# Patient Record
Sex: FEMALE | Race: WHITE | NOT HISPANIC OR LATINO | Employment: FULL TIME | URBAN - METROPOLITAN AREA
[De-identification: names, ages, dates, MRNs, and addresses within clinical notes are randomized per-mention and may not be internally consistent; named-entity substitution may affect disease eponyms.]

---

## 2018-03-08 ENCOUNTER — OFFICE VISIT (OUTPATIENT)
Dept: OBSTETRICS AND GYNECOLOGY | Facility: CLINIC | Age: 28
End: 2018-03-08
Attending: OBSTETRICS & GYNECOLOGY
Payer: COMMERCIAL

## 2018-03-08 VITALS
WEIGHT: 264 LBS | HEIGHT: 69 IN | SYSTOLIC BLOOD PRESSURE: 126 MMHG | BODY MASS INDEX: 39.1 KG/M2 | DIASTOLIC BLOOD PRESSURE: 78 MMHG

## 2018-03-08 DIAGNOSIS — Z12.4 PAP SMEAR FOR CERVICAL CANCER SCREENING: Primary | ICD-10-CM

## 2018-03-08 DIAGNOSIS — Z01.419 ENCOUNTER FOR GYNECOLOGICAL EXAMINATION (GENERAL) (ROUTINE) WITHOUT ABNORMAL FINDINGS: ICD-10-CM

## 2018-03-08 PROCEDURE — 99999 PR PBB SHADOW E&M-EST. PATIENT-LVL III: CPT | Mod: PBBFAC,,, | Performed by: OBSTETRICS & GYNECOLOGY

## 2018-03-08 PROCEDURE — 88175 CYTOPATH C/V AUTO FLUID REDO: CPT

## 2018-03-08 PROCEDURE — 99395 PREV VISIT EST AGE 18-39: CPT | Mod: S$GLB,,, | Performed by: OBSTETRICS & GYNECOLOGY

## 2018-03-08 RX ORDER — ETONOGESTREL AND ETHINYL ESTRADIOL VAGINAL RING .015; .12 MG/D; MG/D
1 RING VAGINAL
Qty: 3 EACH | Refills: 10 | Status: CANCELLED | OUTPATIENT
Start: 2018-03-08

## 2018-03-08 RX ORDER — CLINDAMYCIN HYDROCHLORIDE 300 MG/1
CAPSULE ORAL
COMMUNITY
Start: 2018-01-31 | End: 2019-03-11

## 2018-03-08 RX ORDER — ETONOGESTREL AND ETHINYL ESTRADIOL VAGINAL RING .015; .12 MG/D; MG/D
1 RING VAGINAL
Qty: 3 EACH | Refills: 11 | Status: SHIPPED | OUTPATIENT
Start: 2018-03-08 | End: 2019-03-11 | Stop reason: SDUPTHER

## 2018-03-08 RX ORDER — MUPIROCIN 20 MG/G
OINTMENT TOPICAL
COMMUNITY
Start: 2018-01-31 | End: 2019-03-11

## 2018-03-08 NOTE — PROGRESS NOTES
Subjective:       Patient ID: Evelin Tripp is a 27 y.o. female.    Chief Complaint:  Annual Exam      Patient Active Problem List   Diagnosis    Family history pertaining to mother       History of Present Illness  27 y.o. yo  here for annual exam. No gyn complaints. Doing well. Was off Nuvaring for a few months because she ran out. Increased stress at work.  at [employer]. Only 2 of them, works long hours. Rarely gets a day off. Crying. Says she is not depressed just stressed out with work. Otherwise doing well. Daughter is 2 years old.       History reviewed. No pertinent past medical history.    Past Surgical History:   Procedure Laterality Date    imbilical hernia       UMBILICAL HERNIA REPAIR         OB History    Para Term  AB Living   1 1 1 0 0 1   SAB TAB Ectopic Multiple Live Births   0 0 0 0 1      # Outcome Date GA Lbr Ralph/2nd Weight Sex Delivery Anes PTL Lv   1 Term 16 40w0d  3.544 kg (7 lb 13 oz) F Vag-Spont  N JACKIE          Patient's last menstrual period was 2018.   Date of Last Pap: 2016    Review of Systems  Review of Systems   Constitutional: Negative for fatigue and unexpected weight change.   Respiratory: Negative for shortness of breath.    Cardiovascular: Negative for chest pain.   Gastrointestinal: Negative for abdominal pain, constipation, diarrhea, nausea and vomiting.   Genitourinary: Negative for dysuria.   Musculoskeletal: Negative for back pain.   Skin: Negative for rash.   Neurological: Negative for headaches.   Hematological: Does not bruise/bleed easily.   Psychiatric/Behavioral: Negative for behavioral problems.        Objective:   Physical Exam:   Constitutional: She is oriented to person, place, and time. Vital signs are normal. She appears well-developed and well-nourished. No distress.        Pulmonary/Chest: She exhibits no mass. Right breast exhibits no mass, no nipple discharge, no skin change, no tenderness, no  bleeding and no swelling. Left breast exhibits no mass, no nipple discharge, no skin change, no tenderness, no bleeding and no swelling. Breasts are symmetrical.        Abdominal: Soft. Normal appearance and bowel sounds are normal. She exhibits no distension and no mass. There is no tenderness. There is no rebound.     Genitourinary: Vagina normal and uterus normal. There is no rash, tenderness, lesion or injury on the right labia. There is no rash, tenderness, lesion or injury on the left labia. Uterus is not deviated, not enlarged, not fixed, not tender, not hosting fibroids and not experiencing uterine prolapse. Cervix is normal. Right adnexum displays no mass, no tenderness and no fullness. Left adnexum displays no mass, no tenderness and no fullness. No erythema, tenderness, rectocele, cystocele or unspecified prolapse of vaginal walls in the vagina. No vaginal discharge found. Cervix exhibits no motion tenderness, no discharge and no friability.           Musculoskeletal: Normal range of motion and moves all extremeties.      Lymphadenopathy:     She has no axillary adenopathy.        Right: No supraclavicular adenopathy present.        Left: No supraclavicular adenopathy present.    Neurological: She is alert and oriented to person, place, and time.    Skin: Skin is warm and dry.    Psychiatric: She has a normal mood and affect. Her behavior is normal. Judgment normal.        Assessment/ Plan:     1. Pap smear for cervical cancer screening  Liquid-based pap smear, screening   2. Encounter for gynecological examination (general) (routine) without abnormal findings  etonogestrel-ethinyl estradiol (NUVARING) 0.12-0.015 mg/24 hr vaginal ring       Follow-up with me in 1 year

## 2019-03-11 ENCOUNTER — OFFICE VISIT (OUTPATIENT)
Dept: OBSTETRICS AND GYNECOLOGY | Facility: CLINIC | Age: 29
End: 2019-03-11
Payer: MEDICAID

## 2019-03-11 VITALS
SYSTOLIC BLOOD PRESSURE: 124 MMHG | HEIGHT: 69 IN | WEIGHT: 268.44 LBS | BODY MASS INDEX: 39.76 KG/M2 | DIASTOLIC BLOOD PRESSURE: 80 MMHG

## 2019-03-11 DIAGNOSIS — Z01.419 ENCOUNTER FOR GYNECOLOGICAL EXAMINATION (GENERAL) (ROUTINE) WITHOUT ABNORMAL FINDINGS: ICD-10-CM

## 2019-03-11 DIAGNOSIS — Z12.4 ENCOUNTER FOR PAPANICOLAOU SMEAR FOR CERVICAL CANCER SCREENING: Primary | ICD-10-CM

## 2019-03-11 PROCEDURE — 99395 PREV VISIT EST AGE 18-39: CPT | Mod: S$PBB,,, | Performed by: OBSTETRICS & GYNECOLOGY

## 2019-03-11 PROCEDURE — 88175 CYTOPATH C/V AUTO FLUID REDO: CPT

## 2019-03-11 PROCEDURE — 99999 PR PBB SHADOW E&M-EST. PATIENT-LVL III: ICD-10-PCS | Mod: PBBFAC,,, | Performed by: OBSTETRICS & GYNECOLOGY

## 2019-03-11 PROCEDURE — 99213 OFFICE O/P EST LOW 20 MIN: CPT | Mod: PBBFAC,PN | Performed by: OBSTETRICS & GYNECOLOGY

## 2019-03-11 PROCEDURE — 99999 PR PBB SHADOW E&M-EST. PATIENT-LVL III: CPT | Mod: PBBFAC,,, | Performed by: OBSTETRICS & GYNECOLOGY

## 2019-03-11 PROCEDURE — 99395 PR PREVENTIVE VISIT,EST,18-39: ICD-10-PCS | Mod: S$PBB,,, | Performed by: OBSTETRICS & GYNECOLOGY

## 2019-03-11 RX ORDER — ETONOGESTREL AND ETHINYL ESTRADIOL VAGINAL RING .015; .12 MG/D; MG/D
1 RING VAGINAL
Qty: 3 EACH | Refills: 11 | Status: SHIPPED | OUTPATIENT
Start: 2019-03-11 | End: 2022-06-27

## 2019-03-11 RX ORDER — LEVOFLOXACIN 500 MG/1
TABLET, FILM COATED ORAL
Refills: 0 | COMMUNITY
Start: 2019-01-11 | End: 2019-03-11

## 2019-03-11 NOTE — PROGRESS NOTES
Subjective:       Patient ID: Evelin Tripp is a 28 y.o. female.    Chief Complaint:  Annual Exam (last pap 2018 normal )      Patient Active Problem List   Diagnosis    Family history pertaining to mother       History of Present Illness  28 y.o. yo  here for annual exam. No gyn complaints. Doing well. Happy wit Nuvaring. Recently fired from job, was very stressful. Daughter doing well.       History reviewed. No pertinent past medical history.    Past Surgical History:   Procedure Laterality Date    imbilical hernia       UMBILICAL HERNIA REPAIR         OB History    Para Term  AB Living   1 1 1 0 0 1   SAB TAB Ectopic Multiple Live Births   0 0 0 0 1      # Outcome Date GA Lbr Ralph/2nd Weight Sex Delivery Anes PTL Lv   1 Term 16 40w0d  3.544 kg (7 lb 13 oz) F Vag-Spont  N JACKIE          Patient's last menstrual period was 2019.   Date of Last Pap: 3/13/2018    Review of Systems  Review of Systems   Constitutional: Negative for fatigue and unexpected weight change.   Respiratory: Negative for shortness of breath.    Cardiovascular: Negative for chest pain.   Gastrointestinal: Negative for abdominal pain, constipation, diarrhea, nausea and vomiting.   Genitourinary: Negative for dysuria.   Musculoskeletal: Negative for back pain.   Skin: Negative for rash.   Neurological: Negative for headaches.   Hematological: Does not bruise/bleed easily.   Psychiatric/Behavioral: Negative for behavioral problems.        Objective:   Physical Exam:   Constitutional: She is oriented to person, place, and time. Vital signs are normal. She appears well-developed and well-nourished. No distress.        Pulmonary/Chest: She exhibits no mass. Right breast exhibits no mass, no nipple discharge, no skin change, no tenderness, no bleeding and no swelling. Left breast exhibits no mass, no nipple discharge, no skin change, no tenderness, no bleeding and no swelling. Breasts are symmetrical.         Abdominal: Soft. Normal appearance and bowel sounds are normal. She exhibits no distension and no mass. There is no tenderness. There is no rebound.     Genitourinary: Vagina normal and uterus normal. There is no rash, tenderness, lesion or injury on the right labia. There is no rash, tenderness, lesion or injury on the left labia. Uterus is not deviated, not enlarged, not fixed, not tender, not hosting fibroids and not experiencing uterine prolapse. Cervix is normal. Right adnexum displays no mass, no tenderness and no fullness. Left adnexum displays no mass, no tenderness and no fullness. No erythema, tenderness, rectocele, cystocele or unspecified prolapse of vaginal walls in the vagina. No vaginal discharge found. Cervix exhibits no motion tenderness, no discharge and no friability.           Musculoskeletal: Normal range of motion and moves all extremeties.      Lymphadenopathy:     She has no axillary adenopathy.        Right: No supraclavicular adenopathy present.        Left: No supraclavicular adenopathy present.    Neurological: She is alert and oriented to person, place, and time.    Skin: Skin is warm and dry.    Psychiatric: She has a normal mood and affect. Her behavior is normal. Judgment normal.        Assessment/ Plan:     1. Encounter for Papanicolaou smear for cervical cancer screening  Liquid-based pap smear, screening   2. Encounter for gynecological examination (general) (routine) without abnormal findings  etonogestrel-ethinyl estradiol (NUVARING) 0.12-0.015 mg/24 hr vaginal ring       Follow-up with me in 1 year

## 2020-03-16 ENCOUNTER — TELEPHONE (OUTPATIENT)
Dept: OBSTETRICS AND GYNECOLOGY | Facility: CLINIC | Age: 30
End: 2020-03-16

## 2020-05-28 ENCOUNTER — OFFICE VISIT (OUTPATIENT)
Dept: URGENT CARE | Facility: CLINIC | Age: 30
End: 2020-05-28
Payer: MEDICAID

## 2020-05-28 VITALS
TEMPERATURE: 99 F | RESPIRATION RATE: 16 BRPM | OXYGEN SATURATION: 97 % | HEART RATE: 86 BPM | HEIGHT: 69 IN | WEIGHT: 285 LBS | BODY MASS INDEX: 42.21 KG/M2

## 2020-05-28 DIAGNOSIS — K12.0 APHTHOUS ULCER: ICD-10-CM

## 2020-05-28 DIAGNOSIS — J01.90 ACUTE BACTERIAL SINUSITIS: Primary | ICD-10-CM

## 2020-05-28 DIAGNOSIS — B96.89 ACUTE BACTERIAL SINUSITIS: Primary | ICD-10-CM

## 2020-05-28 PROCEDURE — 99213 PR OFFICE/OUTPT VISIT, EST, LEVL III, 20-29 MIN: ICD-10-PCS | Mod: S$GLB,,, | Performed by: PHYSICIAN ASSISTANT

## 2020-05-28 PROCEDURE — 99213 OFFICE O/P EST LOW 20 MIN: CPT | Mod: S$GLB,,, | Performed by: PHYSICIAN ASSISTANT

## 2020-05-28 RX ORDER — AZELASTINE 1 MG/ML
1 SPRAY, METERED NASAL 2 TIMES DAILY
Qty: 30 ML | Refills: 0 | Status: SHIPPED | OUTPATIENT
Start: 2020-05-28 | End: 2022-06-27

## 2020-05-28 RX ORDER — DROSPIRENONE AND ETHINYL ESTRADIOL TABLETS 0.02-3(28)
KIT ORAL
COMMUNITY
Start: 2020-05-27 | End: 2022-06-27 | Stop reason: SDUPTHER

## 2020-05-28 RX ORDER — AMOXICILLIN AND CLAVULANATE POTASSIUM 875; 125 MG/1; MG/1
1 TABLET, FILM COATED ORAL 2 TIMES DAILY
Qty: 20 TABLET | Refills: 0 | Status: SHIPPED | OUTPATIENT
Start: 2020-05-28 | End: 2020-06-07

## 2020-05-28 RX ORDER — FLUOXETINE HYDROCHLORIDE 20 MG/1
CAPSULE ORAL
COMMUNITY
Start: 2020-04-10 | End: 2022-06-27

## 2020-05-29 NOTE — PATIENT INSTRUCTIONS
PLEASE READ YOUR DISCHARGE INSTRUCTIONS ENTIRELY AS IT CONTAINS IMPORTANT INFORMATION.  - Rest.    - Drink plenty of fluids.    - Tylenol or Ibuprofen as directed as needed for fever/pain.    - If you were prescribed antibiotics, please take them to completion.  - If you are female and on birth control pills - please use additional methods of contraception to prevent pregnancy while on antibiotics and for one cycle after.   - If you were prescribed a narcotic medication or muscle relaxer, do not drive or operate heavy equipment or machinery while taking these medications, as they can cause drowsiness.   - If you smoke, please stop smoking.  -You must understand that you've received an Urgent Care treatment only and that you may be released before all your medical problems are known or treated. You, the patient, will    arrange for follow up care as instructed. Please arrange follow up with your primary medical clinic as soon as possible.   - Follow up with your PCP or specialty clinic as directed in the next 1-2 weeks if not improved or as needed.  You can call [phone] to schedule an appointment with the appropriate provider.    - Please return to Urgent Care or to the Emergency Department if your symptoms worsen.    Patient aware and verbalized understanding.        Acute Bacterial Rhinosinusitis (ABRS)    Acute bacterial rhinosinusitis (ABRS) is an infection of your nasal cavity and sinuses. Its caused by bacteria. Acute means that youve had symptoms for less than 12 weeks.  Understanding your sinuses  The nasal cavity is the large air-filled space behind your nose. The sinuses are a group of spaces formed by the bones of your face. They connect with your nasal cavity. ABRS causes the tissue lining these spaces to become inflamed. Mucus may not drain normally. This leads to facial pain and other symptoms.  What causes ABRS?  ABRS most often follows an upper respiratory infection caused by a virus.  Bacteria then infect the lining of your nasal cavity and sinuses. But you can also get ABRS if you have:  · Nasal allergies  · Long-term nasal swelling and congestion not caused by allergies  · Blockage in the nose  Symptoms of ABRS  The symptoms of ABRS may be different for each person, and can include:  · Nasal congestion  · Runny nose  · Fluid draining from the nose down the throat (postnasal drip)  · Headache  · Cough  · Pain in the sinuses  · Thick, colored fluid from the nose (mucus)  · Fever  Diagnosing ABRS  ABRS may be diagnosed if youve had an upper respiratory infection like a cold and cough for longer than 10 to 14 days. Your health care provider will ask about your symptoms and your medical history. The provider will check your vital signs, including your temperature. Youll have a physical exam. The health care provider will check your ears, nose, and throat. You likely wont need any tests. If ABRS comes back, you may have a culture or other tests.  Treatment for ABRS  Treatment may include:  · Antibiotic medicine. This is for symptoms that last for at least 10 to 14 days.  · Nasal corticosteroid medicine. Drops or spray used in the nose can lessen swelling and congestion.  · Over-the-counter pain medicine. This is to lessen sinus pain and pressure.  · Nasal decongestant medicine. Spray or drops may help to lessen congestion. Do not use them for more than a few days.  · Salt wash (saline irrigation). This can help to loosen mucus.  Possible complications of ABRS  ABRS may come back or become long-term (chronic).  In rare cases, ABRS may cause complications such as:   · Inflamed tissue around the brain and spinal cord (meningitis)  · Inflamed tissue around the eyes (orbital cellulitis)  · Inflamed bones around the sinuses (osteitis)  These problems may need to be treated in a hospital with intravenous (IV) antibiotic medicine or surgery.  When to call the health care provider  Call your health care  provider if you have any of the following:  · Symptoms that dont get better, or get worse  · Symptoms that dont get better after 3 to 5 days on antibiotics  · Trouble seeing  · Swelling around your eyes  · Confusion or trouble staying awake   Date Last Reviewed: 3/3/2015  © 3163-2134 [employer]. [address] [place] [address], [place] [address]. All rights reserved. This information is not intended as a substitute for professional medical care. Always follow your healthcare professional's instructions.        Canker Sore    A canker sore (also called an aphthous ulcer) is a painful sore on the lining of the mouth. It is most painful during the first few days, and it lasts about 7 to 14 days before going away.  Causes  Canker sores are not cold sores or fever blisters. They are not contagious, so they are not spread by contact. The exact cause of canker sores is not clear, but there are a number of things that can trigger them in different people.  · Mild injury, such as biting the inside of the mouth, lip, or cheek, or dental procedures  · Stress  · Poor diet, or lack of certain nutrients, including B vitamins and iron  · Foods that can irritate the mouth, including tomatoes, citrus fruits, and some nuts (foods that are acidic or contain bitter substances called tannins)  · Irritating chemicals, such as those in some toothpastes and mouthwashes  · Certain chronic illnesses  Symptoms  Canker sores are found on the lining of the mouth. They can be inside the cheeks or lips, on the roof of the mouth, at the base of the gums, on the tongue, or in the back of the throat. Canker sores typically have these characteristics:  · Small, flat (not raised) sores  · Can be white or yellowish bumps that are red around the edges or have a red halo  · Usually small in size, roundish, and in groups  · Accompanied by pain or burning  Canker sores do not leave a scar. But they usually come back.  Home care  The goals of canker  sore treatment are to decrease the pain, speed healing, and prevent recurrence. No single treatment works for everyone. Try a number of techniques to see what works best.  General care  · You may find that soft, easy-to-chew foods cause less pain. Use a straw to direct liquids away from the sore.  · Use a soft-bristle toothbrush, and brush your teeth gently.  · Avoid acidic, salty, or spicy foods.  · Avoid injuring the inside of your mouth, or scraping your existing canker sores, by avoiding crusty and crunchy foods like french bread and chips.  Medicines  You can try over-the-counter medicines that cover the sores and numb them. This protects the sores while they heal and helps reduce pain.  Homemade rinses and solutions  You can use these solutions as mouth rinses. Spit them out after using them. You can also dab them on the sores. You can repeat these treatments as often as needed.  · Rinse your mouth with saltwater.  · Mix equal amounts of hydrogen peroxide and water. You can use it as a mouthwash or dab it on spots with a cotton swab. You can also add sodium bicarbonate to this to make a paste, and then dab it on spots.  Follow-up care  Follow up with your healthcare provider, or as advised.  · If a culture was done, you will be notified if the treatment needs to be changed. You can call as directed for the results.  Call 911  Contact emergency services if any of these occur:  · Trouble breathing  · Inability to swallow  · Extreme drowsiness or trouble awakening  · Fainting or loss of consciousness  · Rapid heart rate  · Seizure  · Stiff neck  When to seek medical advice  Call your healthcare provider right away if any of these occur:  · You have a fever of 100.4°F (38°C) or higher.  · You are pregnant.  · You just had surgery or another medical procedure, or you were just discharged from the hospital.  · You are unable to eat or swallow due to pain.  Date Last Reviewed: 7/30/2015  © 4907-3976 [employer]. [address] [place] [address], [place] [address]. All rights reserved. This information is not intended as a substitute for professional medical care. Always follow your healthcare professional's instructions.

## 2020-05-29 NOTE — PROGRESS NOTES
"Subjective:       Patient ID: Evelin Tripp is a 30 y.o. female.    Vitals:  height is 5' 9" (1.753 m) and weight is 129.3 kg (285 lb). Her oral temperature is 99.4 °F (37.4 °C). Her pulse is 86. Her respiration is 16 and oxygen saturation is 97%.     Chief Complaint: Sinus Problem and Oral Pain    Ms. Tripp presents for evaluation of sinus pain, pressure, congestion, and oral pain.  She has had these symptoms for 6 weeks.  She initially had cough with sputum production & wheezing, but this is much improved.   She is now having sores & pain in the roof of her mouth.  This started after she had been eating multiple cough drops a day.  She denies any fevers, chills, SOB.  She has taken cough drops, mucinex, gargled salt water for symptoms with some relief.     Sinus Problem   This is a new problem. Episode onset: 6 weeks. The problem has been gradually improving since onset. There has been no fever. Her pain is at a severity of 2/10. The pain is mild. Associated symptoms include congestion, coughing and sinus pressure. Pertinent negatives include no chills, diaphoresis, ear pain, shortness of breath or sore throat. Treatments tried: mucinex. The treatment provided no relief.   Oral Pain    This is a new problem. Episode onset: 2 days  The problem occurs constantly. The problem has been gradually improving. The pain is at a severity of 2/10. The pain is mild. Associated symptoms include sinus pressure. Pertinent negatives include no fever. Treatments tried: gargle salt water. The treatment provided no relief.       Constitution: Negative for chills, sweating, fatigue and fever.   HENT: Positive for mouth sores, congestion, postnasal drip, sinus pain and sinus pressure. Negative for ear pain, ear discharge, sore throat and voice change.    Neck: Negative for painful lymph nodes.   Cardiovascular: Negative for chest pain, leg swelling, palpitations and sob on exertion.   Eyes: Negative for eye redness. "   Respiratory: Positive for cough and sputum production. Negative for chest tightness, bloody sputum, COPD, shortness of breath, stridor, wheezing and asthma.    Gastrointestinal: Negative for nausea and vomiting.   Musculoskeletal: Negative for muscle ache.   Skin: Negative for rash.   Allergic/Immunologic: Negative for seasonal allergies and asthma.   Hematologic/Lymphatic: Negative for swollen lymph nodes.       Objective:      Physical Exam   Constitutional: She is oriented to person, place, and time. She appears well-developed and well-nourished. She is cooperative.  Non-toxic appearance. She does not have a sickly appearance. She does not appear ill. No distress.   HENT:   Head: Normocephalic and atraumatic.   Right Ear: Hearing, external ear and ear canal normal. Tympanic membrane is not erythematous, not retracted and not bulging. A middle ear effusion is present.   Left Ear: Hearing, external ear and ear canal normal. Tympanic membrane is not erythematous, not retracted and not bulging. A middle ear effusion is present.   Nose: No mucosal edema, rhinorrhea or nasal deformity. No epistaxis. Right sinus exhibits maxillary sinus tenderness. Right sinus exhibits no frontal sinus tenderness. Left sinus exhibits maxillary sinus tenderness. Left sinus exhibits no frontal sinus tenderness.   Mouth/Throat: Uvula is midline, oropharynx is clear and moist and mucous membranes are normal. No trismus in the jaw. Normal dentition. No uvula swelling. No oropharyngeal exudate, posterior oropharyngeal edema or posterior oropharyngeal erythema. Tonsils are 2+ on the right. Tonsils are 2+ on the left. No tonsillar exudate.       3 aphthous ulcers to roof of mouth.  No other sores or ulcers throughout mouth.   Eyes: Conjunctivae and lids are normal. No scleral icterus.   Neck: Trachea normal, full passive range of motion without pain and phonation normal. Neck supple. No neck rigidity. No edema and no erythema present.    Cardiovascular: Normal rate, regular rhythm, normal heart sounds, intact distal pulses and normal pulses.   Pulmonary/Chest: Effort normal and breath sounds normal. No stridor. No respiratory distress. She has no decreased breath sounds. She has no wheezes. She has no rhonchi. She has no rales.   Abdominal: Normal appearance.   Musculoskeletal: Normal range of motion. She exhibits no edema or deformity.   Lymphadenopathy:     She has no cervical adenopathy.   Neurological: She is alert and oriented to person, place, and time. She exhibits normal muscle tone. Coordination normal.   Skin: Skin is warm, dry, intact, not diaphoretic and not pale.   Psychiatric: She has a normal mood and affect. Her speech is normal and behavior is normal. Judgment and thought content normal. Cognition and memory are normal.   Nursing note and vitals reviewed.        Assessment:       1. Acute bacterial sinusitis    2. Aphthous ulcer        Plan:         Acute bacterial sinusitis    Aphthous ulcer    Other orders  -     (Magic mouthwash) 1:1:1 Benadryl 12.5mg/5ml liq, aluminum & magnesium hydroxide-simehticone (Maalox), lidocaine viscous 2%; Swish and spit 10 mLs every 4 (four) hours as needed. for mouth sores  Dispense: 360 mL; Refill: 0  -     amoxicillin-clavulanate 875-125mg (AUGMENTIN) 875-125 mg per tablet; Take 1 tablet by mouth 2 (two) times daily. for 10 days  Dispense: 20 tablet; Refill: 0  -     azelastine (ASTELIN) 137 mcg (0.1 %) nasal spray; 1 spray (137 mcg total) by Nasal route 2 (two) times daily. for 5 days  Dispense: 30 mL; Refill: 0      Patient Instructions     PLEASE READ YOUR DISCHARGE INSTRUCTIONS ENTIRELY AS IT CONTAINS IMPORTANT INFORMATION.  - Rest.    - Drink plenty of fluids.    - Tylenol or Ibuprofen as directed as needed for fever/pain.    - If you were prescribed antibiotics, please take them to completion.  - If you are female and on birth control pills - please use additional methods of contraception  to prevent pregnancy while on antibiotics and for one cycle after.   - If you were prescribed a narcotic medication or muscle relaxer, do not drive or operate heavy equipment or machinery while taking these medications, as they can cause drowsiness.   - If you smoke, please stop smoking.  -You must understand that you've received an Urgent Care treatment only and that you may be released before all your medical problems are known or treated. You, the patient, will    arrange for follow up care as instructed. Please arrange follow up with your primary medical clinic as soon as possible.   - Follow up with your PCP or specialty clinic as directed in the next 1-2 weeks if not improved or as needed.  You can call [phone] to schedule an appointment with the appropriate provider.    - Please return to Urgent Care or to the Emergency Department if your symptoms worsen.    Patient aware and verbalized understanding.        Acute Bacterial Rhinosinusitis (ABRS)    Acute bacterial rhinosinusitis (ABRS) is an infection of your nasal cavity and sinuses. Its caused by bacteria. Acute means that youve had symptoms for less than 12 weeks.  Understanding your sinuses  The nasal cavity is the large air-filled space behind your nose. The sinuses are a group of spaces formed by the bones of your face. They connect with your nasal cavity. ABRS causes the tissue lining these spaces to become inflamed. Mucus may not drain normally. This leads to facial pain and other symptoms.  What causes ABRS?  ABRS most often follows an upper respiratory infection caused by a virus. Bacteria then infect the lining of your nasal cavity and sinuses. But you can also get ABRS if you have:  · Nasal allergies  · Long-term nasal swelling and congestion not caused by allergies  · Blockage in the nose  Symptoms of ABRS  The symptoms of ABRS may be different for each person, and can include:  · Nasal congestion  · Runny nose  · Fluid draining from the  nose down the throat (postnasal drip)  · Headache  · Cough  · Pain in the sinuses  · Thick, colored fluid from the nose (mucus)  · Fever  Diagnosing ABRS  ABRS may be diagnosed if youve had an upper respiratory infection like a cold and cough for longer than 10 to 14 days. Your health care provider will ask about your symptoms and your medical history. The provider will check your vital signs, including your temperature. Youll have a physical exam. The health care provider will check your ears, nose, and throat. You likely wont need any tests. If ABRS comes back, you may have a culture or other tests.  Treatment for ABRS  Treatment may include:  · Antibiotic medicine. This is for symptoms that last for at least 10 to 14 days.  · Nasal corticosteroid medicine. Drops or spray used in the nose can lessen swelling and congestion.  · Over-the-counter pain medicine. This is to lessen sinus pain and pressure.  · Nasal decongestant medicine. Spray or drops may help to lessen congestion. Do not use them for more than a few days.  · Salt wash (saline irrigation). This can help to loosen mucus.  Possible complications of ABRS  ABRS may come back or become long-term (chronic).  In rare cases, ABRS may cause complications such as:   · Inflamed tissue around the brain and spinal cord (meningitis)  · Inflamed tissue around the eyes (orbital cellulitis)  · Inflamed bones around the sinuses (osteitis)  These problems may need to be treated in a hospital with intravenous (IV) antibiotic medicine or surgery.  When to call the health care provider  Call your health care provider if you have any of the following:  · Symptoms that dont get better, or get worse  · Symptoms that dont get better after 3 to 5 days on antibiotics  · Trouble seeing  · Swelling around your eyes  · Confusion or trouble staying awake   Date Last Reviewed: 3/3/2015  © 4067-3069 [employer]. [address] [place] [address], [place] [address]. All rights  reserved. This information is not intended as a substitute for professional medical care. Always follow your healthcare professional's instructions.        Canker Sore    A canker sore (also called an aphthous ulcer) is a painful sore on the lining of the mouth. It is most painful during the first few days, and it lasts about 7 to 14 days before going away.  Causes  Canker sores are not cold sores or fever blisters. They are not contagious, so they are not spread by contact. The exact cause of canker sores is not clear, but there are a number of things that can trigger them in different people.  · Mild injury, such as biting the inside of the mouth, lip, or cheek, or dental procedures  · Stress  · Poor diet, or lack of certain nutrients, including B vitamins and iron  · Foods that can irritate the mouth, including tomatoes, citrus fruits, and some nuts (foods that are acidic or contain bitter substances called tannins)  · Irritating chemicals, such as those in some toothpastes and mouthwashes  · Certain chronic illnesses  Symptoms  Canker sores are found on the lining of the mouth. They can be inside the cheeks or lips, on the roof of the mouth, at the base of the gums, on the tongue, or in the back of the throat. Canker sores typically have these characteristics:  · Small, flat (not raised) sores  · Can be white or yellowish bumps that are red around the edges or have a red halo  · Usually small in size, roundish, and in groups  · Accompanied by pain or burning  Canker sores do not leave a scar. But they usually come back.  Home care  The goals of canker sore treatment are to decrease the pain, speed healing, and prevent recurrence. No single treatment works for everyone. Try a number of techniques to see what works best.  General care  · You may find that soft, easy-to-chew foods cause less pain. Use a straw to direct liquids away from the sore.  · Use a soft-bristle toothbrush, and brush your teeth  gently.  · Avoid acidic, salty, or spicy foods.  · Avoid injuring the inside of your mouth, or scraping your existing canker sores, by avoiding crusty and crunchy foods like french bread and chips.  Medicines  You can try over-the-counter medicines that cover the sores and numb them. This protects the sores while they heal and helps reduce pain.  Homemade rinses and solutions  You can use these solutions as mouth rinses. Spit them out after using them. You can also dab them on the sores. You can repeat these treatments as often as needed.  · Rinse your mouth with saltwater.  · Mix equal amounts of hydrogen peroxide and water. You can use it as a mouthwash or dab it on spots with a cotton swab. You can also add sodium bicarbonate to this to make a paste, and then dab it on spots.  Follow-up care  Follow up with your healthcare provider, or as advised.  · If a culture was done, you will be notified if the treatment needs to be changed. You can call as directed for the results.  Call 911  Contact emergency services if any of these occur:  · Trouble breathing  · Inability to swallow  · Extreme drowsiness or trouble awakening  · Fainting or loss of consciousness  · Rapid heart rate  · Seizure  · Stiff neck  When to seek medical advice  Call your healthcare provider right away if any of these occur:  · You have a fever of 100.4°F (38°C) or higher.  · You are pregnant.  · You just had surgery or another medical procedure, or you were just discharged from the hospital.  · You are unable to eat or swallow due to pain.  Date Last Reviewed: 7/30/2015 © 2000-2017 [employer]. [address] [place] [address], [place] [address]. All rights reserved. This information is not intended as a substitute for professional medical care. Always follow your healthcare professional's instructions.

## 2022-06-02 ENCOUNTER — TELEPHONE (OUTPATIENT)
Dept: OBSTETRICS AND GYNECOLOGY | Facility: CLINIC | Age: 32
End: 2022-06-02
Payer: MEDICAID

## 2022-06-02 DIAGNOSIS — N93.9 ABNORMAL UTERINE BLEEDING (AUB): Primary | ICD-10-CM

## 2022-06-02 NOTE — TELEPHONE ENCOUNTER
Advised per Dr. Peters.  
Last seen 3/2019    States she's been on her period for the last 3 weeks.  It started out as spotting but then for a week, it was black.  Now she is having regular red blood.  Had been on OCP previously but stopped for 3 months and recently restarted it 3 months ago.  D/t the bleeding, pt stopped taking the OCP 5 days ago.  Denies s/s of anemia or saturating pad hourly.  States some days are heavier than others.  Denies vaginitis.  Mentioned she didn't have a period last month.  Usually takes the placebo pills.     Pt was taking Drospirenone until she stopped 5 days ago. Inquiring if she should restart it or hold off until appt.  Pt is not sexually active at this time.    [facility] and appt with Dr. Peters on 6/27.  ER precautions given.    Please advise, thanks  
Restart OCP and agree with appt with u/s  
Never smoker

## 2022-06-24 ENCOUNTER — HOSPITAL ENCOUNTER (OUTPATIENT)
Dept: RADIOLOGY | Facility: OTHER | Age: 32
Discharge: HOME | End: 2022-06-24
Attending: OBSTETRICS & GYNECOLOGY
Payer: OTHER GOVERNMENT

## 2022-06-24 DIAGNOSIS — N93.9 ABNORMAL UTERINE BLEEDING (AUB): ICD-10-CM

## 2022-06-24 PROCEDURE — 76856 US EXAM PELVIC COMPLETE: CPT | Mod: 26,,, | Performed by: RADIOLOGY

## 2022-06-24 PROCEDURE — 76830 TRANSVAGINAL US NON-OB: CPT | Mod: TC

## 2022-06-24 PROCEDURE — 76856 US PELVIS COMP WITH TRANSVAG NON-OB (XPD): ICD-10-PCS | Mod: 26,,, | Performed by: RADIOLOGY

## 2022-06-24 PROCEDURE — 76830 TRANSVAGINAL US NON-OB: CPT | Mod: 26,,, | Performed by: RADIOLOGY

## 2022-06-24 PROCEDURE — 76830 US PELVIS COMP WITH TRANSVAG NON-OB (XPD): ICD-10-PCS | Mod: 26,,, | Performed by: RADIOLOGY

## 2022-06-27 ENCOUNTER — OFFICE VISIT (OUTPATIENT)
Dept: OBSTETRICS AND GYNECOLOGY | Facility: CLINIC | Age: 32
End: 2022-06-27
Attending: OBSTETRICS & GYNECOLOGY
Payer: OTHER GOVERNMENT

## 2022-06-27 VITALS — HEIGHT: 69 IN | BODY MASS INDEX: 43.4 KG/M2 | WEIGHT: 293 LBS

## 2022-06-27 DIAGNOSIS — N93.8 DUB (DYSFUNCTIONAL UTERINE BLEEDING): Primary | ICD-10-CM

## 2022-06-27 PROCEDURE — 99202 OFFICE O/P NEW SF 15 MIN: CPT | Mod: S$GLB,,, | Performed by: OBSTETRICS & GYNECOLOGY

## 2022-06-27 PROCEDURE — 99999 PR PBB SHADOW E&M-EST. PATIENT-LVL III: CPT | Mod: PBBFAC,,, | Performed by: OBSTETRICS & GYNECOLOGY

## 2022-06-27 PROCEDURE — 99999 PR PBB SHADOW E&M-EST. PATIENT-LVL III: ICD-10-PCS | Mod: PBBFAC,,, | Performed by: OBSTETRICS & GYNECOLOGY

## 2022-06-27 PROCEDURE — 99202 PR OFFICE/OUTPT VISIT, NEW, LEVL II, 15-29 MIN: ICD-10-PCS | Mod: S$GLB,,, | Performed by: OBSTETRICS & GYNECOLOGY

## 2022-06-27 RX ORDER — DROSPIRENONE AND ETHINYL ESTRADIOL TABLETS 0.02-3(28)
1 KIT ORAL DAILY
Qty: 84 TABLET | Refills: 3 | Status: SHIPPED | OUTPATIENT
Start: 2022-06-27 | End: 2022-10-24

## 2022-06-27 NOTE — PROGRESS NOTES
Subjective:       Patient ID: Evelin Tripp is a 32 y.o. female.    Chief Complaint:  abnormal bleeding        History of Present Illness  Evelin Tripp is a 32 y.o. female  who presents for new patient old file visit for irregular periods. Was doing fine on OCP and had an episode of irregular bleeding. Stopped and then once she restarted another pack it was fine. Had ultrasound done today that was normal. Due for annual. Wants to continue OCP.  Patient's last menstrual period was 2022 (approximate).   Date of Last Pap: No result found    Review of Systems  Review of Systems   Constitutional: Negative for chills and fever.   Gastrointestinal: Negative for abdominal pain, constipation, diarrhea, nausea and vomiting.   Genitourinary: Positive for vaginal bleeding. Negative for dysuria, flank pain, frequency, hematuria and urgency.   Musculoskeletal: Negative for back pain.   Skin: Negative for rash.   Neurological: Negative for headaches.        Objective:   Physical Exam:   Constitutional: She appears well-developed and well-nourished.                                  Assessment/ Plan:     1. DUB (dysfunctional uterine bleeding)  LORYNA, 28, 3-0.02 mg per tablet       Follow up if symptoms worsen or fail to improve.    As of 2021, the Cures Act has been passed nationally. This new law requires that all doctors progress notes, lab results, pathology reports and radiology reports be released IMMEDIATELY to the patient in the patient portal. That means that the results are released to you at the EXACT same time they are released to me. Therefore, with all of the patients that I have I am not able to reply to each patient exactly when the results come in. So there will be a delay from when you see the results to when I see them and have time to come up with a response to send you. Also I only see these results when I am on the computer at work. So if the results come in over the weekend or  after 5 pm of a work day, I will not see them until the next business day. As you can tell, this is a challenge as a physician to give every patient the quick response they hope for and deserve. So please be patient! Thanks for understanding, Dr. Peters    Answers for HPI/ROS submitted by the patient on 2022  Chronicity: new  Onset: more than 1 month ago  Frequency: daily  Progression since onset: resolved  Pain severity: no pain  Affected side: both  Pregnant now?: No  anorexia: No  discolored urine: No  painful intercourse: No  Vaginal bleeding: typical of menses  Passing clots?: Yes  Passing tissue?: No  Aggravated by: nothing  treatments tried: nothing  Improvement on treatment: no relief  Sexual activity: sexually active  Partner with STD symptoms: no  Birth control: condoms, oral contraceptives  Menstrual history: irregular  STD: No  abdominal surgery: Yes   section: No  Ectopic pregnancy: No  Endometriosis: No  herpes simplex: No  gynecological surgery: No  menorrhagia: Yes  metrorrhagia: No  miscarriage: No  ovarian cysts: Yes  perineal abscess: No  PID: No  terminated pregnancy: No  vaginosis: No

## 2022-09-21 ENCOUNTER — OFFICE VISIT (OUTPATIENT)
Dept: URGENT CARE | Facility: CLINIC | Age: 32
End: 2022-09-21
Payer: OTHER GOVERNMENT

## 2022-09-21 VITALS
OXYGEN SATURATION: 98 % | DIASTOLIC BLOOD PRESSURE: 93 MMHG | RESPIRATION RATE: 16 BRPM | WEIGHT: 293 LBS | TEMPERATURE: 98 F | SYSTOLIC BLOOD PRESSURE: 135 MMHG | BODY MASS INDEX: 43.56 KG/M2 | HEART RATE: 83 BPM

## 2022-09-21 DIAGNOSIS — J06.9 VIRAL URI WITH COUGH: Primary | ICD-10-CM

## 2022-09-21 DIAGNOSIS — Z11.52 ENCOUNTER FOR SCREENING FOR COVID-19: ICD-10-CM

## 2022-09-21 DIAGNOSIS — R09.81 COUGH WITH CONGESTION OF PARANASAL SINUS: ICD-10-CM

## 2022-09-21 DIAGNOSIS — H65.193 ACUTE MEE (MIDDLE EAR EFFUSION), BILATERAL: ICD-10-CM

## 2022-09-21 DIAGNOSIS — R05.8 COUGH WITH CONGESTION OF PARANASAL SINUS: ICD-10-CM

## 2022-09-21 DIAGNOSIS — J02.9 SORE THROAT: ICD-10-CM

## 2022-09-21 LAB
CTP QC/QA: YES
CTP QC/QA: YES
MOLECULAR STREP A: NEGATIVE
SARS-COV-2 RDRP RESP QL NAA+PROBE: NEGATIVE

## 2022-09-21 PROCEDURE — U0002: ICD-10-PCS | Mod: QW,S$GLB,, | Performed by: PHYSICIAN ASSISTANT

## 2022-09-21 PROCEDURE — 87651 POCT STREP A MOLECULAR: ICD-10-PCS | Mod: QW,S$GLB,, | Performed by: PHYSICIAN ASSISTANT

## 2022-09-21 PROCEDURE — U0002 COVID-19 LAB TEST NON-CDC: HCPCS | Mod: QW,S$GLB,, | Performed by: PHYSICIAN ASSISTANT

## 2022-09-21 PROCEDURE — 99214 PR OFFICE/OUTPT VISIT, EST, LEVL IV, 30-39 MIN: ICD-10-PCS | Mod: S$GLB,,, | Performed by: PHYSICIAN ASSISTANT

## 2022-09-21 PROCEDURE — 99214 OFFICE O/P EST MOD 30 MIN: CPT | Mod: S$GLB,,, | Performed by: PHYSICIAN ASSISTANT

## 2022-09-21 PROCEDURE — 87651 STREP A DNA AMP PROBE: CPT | Mod: QW,S$GLB,, | Performed by: PHYSICIAN ASSISTANT

## 2022-09-21 RX ORDER — FLUOXETINE 10 MG/1
10 CAPSULE ORAL DAILY
COMMUNITY
End: 2022-10-24

## 2022-09-21 NOTE — PATIENT INSTRUCTIONS
PLEASE READ YOUR DISCHARGE INSTRUCTIONS ENTIRELY AS IT CONTAINS IMPORTANT INFORMATION.    Patient had covid testing done today.      If Negative and no direct exposure: symptom free without fever reducing meds in 24 hours - can go back to work in 24 hours with surgical mask for 14 days.  If you are directly exposed to someone who tested positive, you need to return for repeat testing in the next 5-7 days.  You may return sooner if you have any new worsening symptoms.    Discussed corona virus precautions and reviewed CDC FAC; printed a copy for patient.  I discussed to continue to monitor their symptoms. Discussed that if their symptoms persist or worsen to seek re-evaluation. Clinic vs. ER precautions were given.  Patient verbalized understanding and agreed with the above plan of care.    - Rest.  Drink plenty of fluids.    - Tylenol/anti-inflammatory(ibuprofen/motrin/aleve) as directed as needed for fever/pain.  For Tylenol, do not exceed 3000 mg/ day. If no contraindication.  -OK to supplement with OTC DayQuil, NyQuil or TheraFlu every 6 hours as needed for cough and congestion.  Use caution of total amount of Tylenol/acetaminophen per day.  - Reviewed radiographs and all diagnostic testing with patient/family.      -Below are suggestions for symptomatic relief:              -Salt water gargles to soothe throat pain.              -Chloroseptic spray also helps to numb throat pain.              -Nasal saline spray reduces inflammation and dryness.              -Warm face compresses to help with facial sinus pain/pressure.              -Vicks vapor rub at night.              -Flonase OTC or Nasacort OTC  once a day for nasal/sinus congestion. DON'T USE IF YOU HAVE GLAUCOMA. CHECK WITH YOUR PHARMACIST/PHYSICIAN.              -Simple foods like chicken noodle soup.              -Mucinex DM (ANY COUGH EXPECTORANT--guaifenesin) for cough or chest congestion with mucus during the day time. Delsym or robitussin (ANY  COUGH SUPPRESSANT--dextromethorphan) helps with coughing at night. Mucinex-DM if you have chest congestion or sputum (caution if history of high blood pressure or palpitations).              -Zyrtec/Claritin/xyzal during the day time  & Benadryl at night (only if severe runny nose) may help with allergies and runny nose. Add decongestant if you have nasal/sinus congestion/sinus pressure/ear fullness sensation. (see below)    Caution with use of Decongestant meds:  -If you DO NOT have Hypertension or any history of palpitations, it is ok to take over the counter Sudafed or Mucinex D or Allegra-D or Claritin-D or Zyrtec-D.  -If you do take one of the above, it is ok to combine that with plain over the counter Mucinex or Allegra or Claritin or Zyrtec. If, for example, you are taking Zyrtec -D, you can combine that with Mucinex, but not Mucinex-D.  If you are taking Mucinex-D, you can combine that with plain Allegra or Claritin or Zyrtec.     -Do not combine pseudophed or phenylephrine with any other brand allergy-D for DECONGESTANT.   -Or vice versa, you can you take plain allergy medications (allegra/claritin/zyrtec with NO Decongestant) and ADD OTC pseudophed or phenelyphrine 2-3 times a day. Avoid taking decongestant late at night or with caffeine as it can keep you up or cause jittery feeling.    -If you DO have Hypertension , anxiety, or palpitations, it is safe to take Coricidin HBP for relief of sinus symptoms.      For your GI symptoms:  -Use gatorade/pedialyte or rehydration packets to help stay hydrated. Vitamin water and plain water do not contain rehydrating electrolytes.  -Increase clear liquids (water, gatorade, pedialyte, broths, jello, etc) Hold off on solids for 12-18 hours. Then advance to BRAT diet (banana, rice, applesauce, tea, toast/crackers), then advance further as tolerated. Avoid spicy or fatty foods.   -May take Imitrol OTC as needed for nausea.   -Use Peptobismol or Immodium to help  alleviate your diarrhea symptoms.   -Take mylanta or simethicone for bloating or gas pain.   -Take pepcid or omeprazole if you have heartburn or reflux sensation.  -Avoid imodium unless you have more than 6 loose stools in 24 hours. Take 1 dose and monitor to see if you can repeat AS IT WILL CAUSE CONSTIPATION.  -Wash hands frequently while sick. Avoid ibuprofen or other NSAIDS until you are well.   -Please go to the ER if you experience worsening abdominal pain, blood in your vomit or stool, high fever, dizziness, fainting, swelling of your abdomen, inability to pass gas or stool, or inability to urinate.         -You must understand that you've received an Urgent Care treatment only and that you may be released before all your medical problems are known or treated. You, the patient, will arrange for follow up care as instructed. Please arrange follow up with your primary medical clinic within 2-5 days if your signs and symptoms have not resolved or worsen.     - Follow up with your PCP or specialty clinic as directed.  You can call [phone] or [phone] to schedule an appointment with the appropriate provider.    - If your condition worsens or fails to improve we recommend that you receive another evaluation at the emergency room immediately or contact your primary medical clinic to discuss your concerns.              Prevention steps for patients with confirmed or suspected COVID-19  Stay home and stay away from family members and friends. The CDC says, you can leave home after these three things have happened: 1) You have had no fever for at least 24 hours (that is one full day of no fever without the use of medicine that reduces fevers) 2) AND other symptoms have improved (for example, when your cough or shortness of breath have improved) 3) AND at least 10 days have passed since your symptoms first appeared OR after 10 days passed from first positive test.  Separate yourself from other people and  animals in your home.  Call ahead before visiting your doctor.  Wear a facemask.  Cover your coughs and sneezes.  Wash your hands often with soap and water; hand  can be used, too.  Avoid sharing personal household items.  Wipe down surfaces used daily.  Monitor your symptoms. Seek prompt medical attention if your illness is worsening (e.g., difficulty breathing).   Before seeking care, call your healthcare provider.  If you have a medical emergency and need to call 911, notify the dispatch personnel that you have, or are being evaluated for COVID-19. If possible, put on a facemask before emergency medical services arrive.        Recommended precautions for household members, intimate partners, and caregivers in a home setting of a patient with symptomatic laboratory-confirmed COVID-19 or a patient under investigation.  Household members, intimate partners, and caregivers in the home setting awaiting tests results have close contact with a person with symptomatic, laboratory-confirmed COVID-19 or a person under investigation. Close contacts should monitor their health; they should call their provider right away if they develop symptoms suggestive of COVID-19 (e.g., fever, cough, shortness of breath).    Close contacts should also follow these recommendations:  Make sure that you understand and can help the patient follow their provider's instructions for medication(s) and care. You should help the patient with basic needs in the home and provide support for getting groceries, prescriptions, and other personal needs.  Monitor the patient's symptoms. If the patient is getting sicker, call his or her healthcare provider and tell them that the patient has laboratory-confirmed COVID-19. If the patient has a medical emergency and you need to call 911, notify the dispatch personnel that the patient has, or is being evaluated for COVID-19.  Household members should stay in another room or be  from the  patient. Household members should use a separate bedroom and bathroom, if available.  Prohibit visitors.  Household members should care for any pets in the home.  Make sure that shared spaces in the home have good air flow, such as by an air conditioner or an opened window, weather permitting.  Perform hand hygiene frequently. Wash your hands often with soap and water for at least 20 seconds or use an alcohol-based hand  (that contains > 60% alcohol) covering all surfaces of your hands and rubbing them together until they feel dry. Soap and water should be used preferentially.  Avoid touching your eyes, nose, and mouth.  The patient should wear a facemask. If the patient is not able to wear a facemask (for example, because it causes trouble breathing), caregivers should wear a mask when they are in the same room as the patient.  Wear a disposable facemask and gloves when you touch or have contact with the patient's blood, stool, or body fluids, such as saliva, sputum, nasal mucus, vomit, urine.  Throw out disposable facemasks and gloves after using them. Do not reuse.  When removing personal protective equipment, first remove and dispose of gloves. Then, immediately clean your hands with soap and water or alcohol-based hand . Next, remove and dispose of facemask, and immediately clean your hands again with soap and water or alcohol-based hand .  You should not share dishes, drinking glasses, cups, eating utensils, towels, bedding, or other items with the patient. After the patient uses these items, you should wash them thoroughly (see below Wash laundry thoroughly).  Clean all high-touch surfaces, such as counters, tabletops, doorknobs, bathroom fixtures, toilets, phones, keyboards, tablets, and bedside tables, every day. Also, clean any surfaces that may have blood, stool, or body fluids on them.  Use a household cleaning spray or wipe, according to the label instructions. Labels  contain instructions for safe and effective use of the cleaning product including precautions you should take when applying the product, such as wearing gloves and making sure you have good ventilation during use of the product.  Wash laundry thoroughly.  Immediately remove and wash clothes or bedding that have blood, stool, or body fluids on them.  Wear disposable gloves while handling soiled items and keep soiled items away from your body. Clean your hands (with soap and water or an alcohol-based hand ) immediately after removing your gloves.  Read and follow directions on labels of laundry or clothing items and detergent. In general, using a normal laundry detergent according to washing machine instructions and dry thoroughly using the warmest temperatures recommended on the clothing label.  Place all used disposable gloves, facemasks, and other contaminated items in a lined container before disposing of them with other household waste. Clean your hands (with soap and water or an alcohol-based hand ) immediately after handling these items. Soap and water should be used preferentially if hands are visibly dirty.  Discuss any additional questions with your state or local health department or healthcare provider. Check available hours when contacting your local health department.    For more information see CDC link below.      https://www.cdc.gov/coronavirus/2019-ncov/hcp/guidance-prevent-spread.html#precautions        Sources:  [facility], [facility]          Instructions for Home Care of Patients and Caretakers with Coronavirus Disease 2019  Limit visitors to the home.  Older persons and those that have chronic medical conditions such as diabetes, lung and heart disease are at increased risk for illness.   If possible, patients should use a separate bedroom while recovering. Caregivers and household members should avoid prolonged contact with the patient which means to  stay 6 feet away and avoid contact with cough droplets.  When close contact is necessary, wash your hands before and immediately after contact.   Perform hand hygiene frequently. Wash your hands often with soap and water for at least 20 seconds or use an alcohol-based hand , covering all surfaces of your hands and rubbing them together until they feel dry.   Avoid touching your eyes, nose, and mouth with unwashed hands.  Avoid sharing household items with the patient. You should not share dishes, drinking glasses, cups, eating utensils, towels, bedding, or other items. After the patient uses these items, you should wash them thoroughly.  Wash laundry thoroughly.   Immediately remove and wash clothes or bedding that have blood, stool, or body fluids on them.  Clean all high-touch surfaces, such as counters, tabletops, doorknobs, bathroom fixtures, toilets, phones, keyboards, tablets, and bedside tables, every day.   Use a household cleaning spray or wipe, according to the label instructions. Labels contain instructions for safe and effective use of the cleaning product including precautions you should take when applying the product, such as wearing gloves and making sure you have good ventilation during use of the product.    For more information see CDC link below.      https://www.cdc.gov/coronavirus/2019-ncov/hcp/guidance-prevent-spread.html#precautions               If your symptoms worsen or if you have any other concerns, please contact [facility] at [phone].

## 2022-09-21 NOTE — LETTER
[address]  [address] ? [place], [address] ? Phone [phone] ? Fax [phone]           Return to Work/School    Patient: Evelin Tripp  YOB: 1990   Date: 09/21/2022      To Whom It May Concern:     Evelin Tripp was in contact with/seen in my office on 09/21/2022. COVID-19 is present in our communities across the [facility]. Not all patients are eligible or appropriate to be tested. In this situation, your employee meets the following criteria:     Evelin Tripp has met the criteria for COVID-19 testing and has a NEGATIVE result. The employee can return to work once they are asymptomatic for 24 hours without the use of fever reducing medications (Tylenol, Motrin, etc).  Okay to return to work on 09/24/2022.     If you have any questions or concerns, or if I can be of further assistance, please do not hesitate to contact me.     Sincerely,    Mark Tobias PA-C

## 2022-09-21 NOTE — PROGRESS NOTES
Subjective:       Patient ID: Evelin Tripp is a 32 y.o. female.    Vitals:  weight is 133.8 kg (295 lb). Her oral temperature is 97.9 °F (36.6 °C). Her blood pressure is 135/93 (abnormal) and her pulse is 83. Her respiration is 16 and oxygen saturation is 98%.   Body mass index is 43.56 kg/m².      Chief Complaint: Sore Throat    32-year-old female who presents to urgent care clinic with her daughter for evaluation.  Already vaccinated for COVID-19 with no known direct COVID exposure.  Complain of symptoms started 4 days ago with generalized body aches, headache, rhinorrhea, nasal/sinus congestion, sore throat, productive cough, and body aches.  Has been taking of twice since her illness has she does not believe in taking medication.  No other associated symptoms.  Requesting COVID and strep testing done.      Medical assistant note:  This is a 32 y.o. female who presents today with a chief complaint of  sore throat, low grade fever (100.0 forehead) and cough with body aches x 4 days.    Sore Throat   This is a new problem. Associated symptoms include congestion, coughing and headaches. Pertinent negatives include no abdominal pain, diarrhea, ear pain, neck pain, shortness of breath, stridor, trouble swallowing or vomiting. She has had no exposure to strep or mono. She has tried NSAIDs for the symptoms. The treatment provided mild relief.     Constitution: Positive for fever. Negative for activity change, appetite change, chills, sweating, fatigue and generalized weakness.   HENT:  Positive for congestion, postnasal drip, sinus pressure and sore throat. Negative for ear pain, hearing loss, facial swelling, sinus pain, trouble swallowing and voice change.    Neck: Negative for neck pain, neck stiffness and painful lymph nodes.   Cardiovascular:  Negative for chest pain, leg swelling, palpitations, sob on exertion and passing out.   Eyes:  Negative for eye discharge, eye pain, photophobia, vision loss, double  vision and blurred vision.   Respiratory:  Positive for cough and sputum production. Negative for chest tightness, bloody sputum, COPD, shortness of breath, stridor, wheezing and asthma.    Gastrointestinal:  Negative for abdominal pain, nausea, vomiting, constipation, diarrhea, bright red blood in stool, rectal bleeding, heartburn and bowel incontinence.   Genitourinary:  Negative for dysuria, frequency, urgency, urine decreased, flank pain, bladder incontinence and hematuria.   Musculoskeletal:  Positive for muscle ache. Negative for trauma, joint pain, joint swelling, abnormal ROM of joint and muscle cramps.   Skin:  Negative for color change, pale, rash and wound.   Allergic/Immunologic: Negative for seasonal allergies, asthma and immunocompromised state.   Neurological:  Positive for headaches. Negative for dizziness, history of vertigo, light-headedness, passing out, facial drooping, speech difficulty, coordination disturbances, loss of balance, disorientation, altered mental status, loss of consciousness, numbness, tingling and seizures.   Hematologic/Lymphatic: Negative for swollen lymph nodes, easy bruising/bleeding and trouble clotting. Does not bruise/bleed easily.   Psychiatric/Behavioral:  Negative for altered mental status and disorientation.      No past medical history on file.    Objective:      Physical Exam   Constitutional: She is oriented to person, place, and time. She appears well-developed. She is cooperative.  Non-toxic appearance. She does not appear ill. No distress.      Comments:Well-appearing   obesity  HENT:   Head: Normocephalic and atraumatic.   Ears:   Right Ear: Hearing, external ear and ear canal normal. No drainage, swelling or tenderness.   Left Ear: Hearing, external ear and ear canal normal. No drainage, swelling or tenderness.      Comments: Mild bilateral middle ear effusion with no bulging or erythema  Nose: Congestion present. No rhinorrhea or purulent discharge. Right  sinus exhibits no maxillary sinus tenderness and no frontal sinus tenderness. Left sinus exhibits no maxillary sinus tenderness and no frontal sinus tenderness.   Mouth/Throat: Uvula is midline, oropharynx is clear and moist and mucous membranes are normal. Mucous membranes are moist. No oral lesions. No trismus in the jaw. No uvula swelling. No oropharyngeal exudate, posterior oropharyngeal edema or posterior oropharyngeal erythema. No tonsillar exudate. Oropharynx is clear.   Eyes: Conjunctivae, EOM and lids are normal. Pupils are equal, round, and reactive to light. No visual field deficit is present. Right eye exhibits no discharge. Left eye exhibits no discharge. Right conjunctiva is not injected. Right conjunctiva has no hemorrhage. Left conjunctiva is not injected. Left conjunctiva has no hemorrhage. Extraocular movement intact vision grossly intact gaze aligned appropriately   Neck: Neck supple. No neck rigidity present.   Cardiovascular: Normal rate, regular rhythm, normal heart sounds and normal pulses.   No murmur heard.  Pulmonary/Chest: Effort normal and breath sounds normal. No accessory muscle usage or stridor. No respiratory distress. She has no wheezes. She exhibits no tenderness.   Abdominal: Normal appearance. She exhibits no distension and no mass. Soft. There is no abdominal tenderness. There is no rebound and no guarding.   Musculoskeletal: Normal range of motion.         General: Normal range of motion.      Right lower leg: No edema.      Left lower leg: No edema.      Comments: Moves all extremities with normal tone, strength, and ROM.  Gait normal.   Lymphadenopathy:     She has no cervical adenopathy.   Neurological: no focal deficit. She is alert, oriented to person, place, and time and at baseline. She has normal motor skills and normal sensation. She displays no weakness, facial symmetry, normal reflexes and no dysarthria. No cranial nerve deficit or sensory deficit. She exhibits normal  muscle tone. She has a normal Finger-Nose-Finger Test. Coordination: Heel to shin test normal. She shows no pronator drift. She displays no seizure activity. Gait and coordination normal. Coordination normal. GCS eye subscore is 4. GCS verbal subscore is 5. GCS motor subscore is 6.   Skin: Skin is warm, dry, not diaphoretic and no rash. Capillary refill takes less than 2 seconds.   Psychiatric: Her speech is normal and behavior is normal. Mood and thought content normal.   Nursing note and vitals reviewed.        Results for orders placed or performed in visit on 09/21/22   POCT COVID-19 Rapid Screening   Result Value Ref Range    POC Rapid COVID Negative Negative     Acceptable Yes    POCT Strep A, Molecular   Result Value Ref Range    Molecular Strep A, POC Negative Negative     Acceptable Yes        Assessment:       1. Viral URI with cough    2. Sore throat    3. Cough with congestion of paranasal sinus    4. Acute DON (middle ear effusion), bilateral    5. Encounter for screening for COVID-19        Nontoxic appearing. Vitals are stable. Rapid covid and strep negative.   All diagnostic testing personally reviewed and interpreted.   Patient has symptoms at this time which is consistent with above diagnosis.        Patient was recommended OTC treatments for their symptoms.      Patient was counseled, explained with the test results meaning, expected course, and answered all of questions. They can also receive results via my chart.  Printed and verbal treatment guidelines/recommendations were given.   Recommend follow-up PCP in the next 2-3 days if new or worsening symptoms.    -We also discussed diet, exercise, and weight loss given obesity.    Patient understands that they received an Urgent Care treatment only and that they may be released before all your medical problems are known or treated. Strict ED versus clinic precautions given.  Patient verbalized understanding and agreed  with plan of care.    Note dictated with voice recognition software, please excuse any grammatical errors.    Plan:         Viral URI with cough    Sore throat  -     POCT COVID-19 Rapid Screening  -     POCT Strep A, Molecular    Cough with congestion of paranasal sinus    Acute DON (middle ear effusion), bilateral    Encounter for screening for COVID-19            Additional MDM:     Heart Failure Score:   COPD = No    Patient Instructions     PLEASE READ YOUR DISCHARGE INSTRUCTIONS ENTIRELY AS IT CONTAINS IMPORTANT INFORMATION.    Patient had covid testing done today.      If Negative and no direct exposure: symptom free without fever reducing meds in 24 hours - can go back to work in 24 hours with surgical mask for 14 days.  If you are directly exposed to someone who tested positive, you need to return for repeat testing in the next 5-7 days.  You may return sooner if you have any new worsening symptoms.    Discussed corona virus precautions and reviewed CDC FAC; printed a copy for patient.  I discussed to continue to monitor their symptoms. Discussed that if their symptoms persist or worsen to seek re-evaluation. Clinic vs. ER precautions were given.  Patient verbalized understanding and agreed with the above plan of care.    - Rest.  Drink plenty of fluids.    - Tylenol/anti-inflammatory(ibuprofen/motrin/aleve) as directed as needed for fever/pain.  For Tylenol, do not exceed 3000 mg/ day. If no contraindication.  -OK to supplement with OTC DayQuil, NyQuil or TheraFlu every 6 hours as needed for cough and congestion.  Use caution of total amount of Tylenol/acetaminophen per day.  - Reviewed radiographs and all diagnostic testing with patient/family.      -Below are suggestions for symptomatic relief:              -Salt water gargles to soothe throat pain.              -Chloroseptic spray also helps to numb throat pain.              -Nasal saline spray reduces inflammation and dryness.              -Warm face  compresses to help with facial sinus pain/pressure.              -Vicks vapor rub at night.              -Flonase OTC or Nasacort OTC  once a day for nasal/sinus congestion. DON'T USE IF YOU HAVE GLAUCOMA. CHECK WITH YOUR PHARMACIST/PHYSICIAN.              -Simple foods like chicken noodle soup.              -Mucinex DM (ANY COUGH EXPECTORANT--guaifenesin) for cough or chest congestion with mucus during the day time. Delsym or robitussin (ANY COUGH SUPPRESSANT--dextromethorphan) helps with coughing at night. Mucinex-DM if you have chest congestion or sputum (caution if history of high blood pressure or palpitations).              -Zyrtec/Claritin/xyzal during the day time  & Benadryl at night (only if severe runny nose) may help with allergies and runny nose. Add decongestant if you have nasal/sinus congestion/sinus pressure/ear fullness sensation. (see below)    Caution with use of Decongestant meds:  -If you DO NOT have Hypertension or any history of palpitations, it is ok to take over the counter Sudafed or Mucinex D or Allegra-D or Claritin-D or Zyrtec-D.  -If you do take one of the above, it is ok to combine that with plain over the counter Mucinex or Allegra or Claritin or Zyrtec. If, for example, you are taking Zyrtec -D, you can combine that with Mucinex, but not Mucinex-D.  If you are taking Mucinex-D, you can combine that with plain Allegra or Claritin or Zyrtec.     -Do not combine pseudophed or phenylephrine with any other brand allergy-D for DECONGESTANT.   -Or vice versa, you can you take plain allergy medications (allegra/claritin/zyrtec with NO Decongestant) and ADD OTC pseudophed or phenelyphrine 2-3 times a day. Avoid taking decongestant late at night or with caffeine as it can keep you up or cause jittery feeling.    -If you DO have Hypertension , anxiety, or palpitations, it is safe to take Coricidin HBP for relief of sinus symptoms.      For your GI symptoms:  -Use gatorade/pedialyte or  rehydration packets to help stay hydrated. Vitamin water and plain water do not contain rehydrating electrolytes.  -Increase clear liquids (water, gatorade, pedialyte, broths, jello, etc) Hold off on solids for 12-18 hours. Then advance to BRAT diet (banana, rice, applesauce, tea, toast/crackers), then advance further as tolerated. Avoid spicy or fatty foods.   -May take Imitrol OTC as needed for nausea.   -Use Peptobismol or Immodium to help alleviate your diarrhea symptoms.   -Take mylanta or simethicone for bloating or gas pain.   -Take pepcid or omeprazole if you have heartburn or reflux sensation.  -Avoid imodium unless you have more than 6 loose stools in 24 hours. Take 1 dose and monitor to see if you can repeat AS IT WILL CAUSE CONSTIPATION.  -Wash hands frequently while sick. Avoid ibuprofen or other NSAIDS until you are well.   -Please go to the ER if you experience worsening abdominal pain, blood in your vomit or stool, high fever, dizziness, fainting, swelling of your abdomen, inability to pass gas or stool, or inability to urinate.         -You must understand that you've received an Urgent Care treatment only and that you may be released before all your medical problems are known or treated. You, the patient, will arrange for follow up care as instructed. Please arrange follow up with your primary medical clinic within 2-5 days if your signs and symptoms have not resolved or worsen.     - Follow up with your PCP or specialty clinic as directed.  You can call [phone] or [phone] to schedule an appointment with the appropriate provider.    - If your condition worsens or fails to improve we recommend that you receive another evaluation at the emergency room immediately or contact your primary medical clinic to discuss your concerns.              Prevention steps for patients with confirmed or suspected COVID-19  Stay home and stay away from family members and friends. The CDC says, you can  leave home after these three things have happened: 1) You have had no fever for at least 24 hours (that is one full day of no fever without the use of medicine that reduces fevers) 2) AND other symptoms have improved (for example, when your cough or shortness of breath have improved) 3) AND at least 10 days have passed since your symptoms first appeared OR after 10 days passed from first positive test.  Separate yourself from other people and animals in your home.  Call ahead before visiting your doctor.  Wear a facemask.  Cover your coughs and sneezes.  Wash your hands often with soap and water; hand  can be used, too.  Avoid sharing personal household items.  Wipe down surfaces used daily.  Monitor your symptoms. Seek prompt medical attention if your illness is worsening (e.g., difficulty breathing).   Before seeking care, call your healthcare provider.  If you have a medical emergency and need to call 911, notify the dispatch personnel that you have, or are being evaluated for COVID-19. If possible, put on a facemask before emergency medical services arrive.        Recommended precautions for household members, intimate partners, and caregivers in a home setting of a patient with symptomatic laboratory-confirmed COVID-19 or a patient under investigation.  Household members, intimate partners, and caregivers in the home setting awaiting tests results have close contact with a person with symptomatic, laboratory-confirmed COVID-19 or a person under investigation. Close contacts should monitor their health; they should call their provider right away if they develop symptoms suggestive of COVID-19 (e.g., fever, cough, shortness of breath).    Close contacts should also follow these recommendations:  Make sure that you understand and can help the patient follow their provider's instructions for medication(s) and care. You should help the patient with basic needs in the home and provide support for getting  groceries, prescriptions, and other personal needs.  Monitor the patient's symptoms. If the patient is getting sicker, call his or her healthcare provider and tell them that the patient has laboratory-confirmed COVID-19. If the patient has a medical emergency and you need to call 911, notify the dispatch personnel that the patient has, or is being evaluated for COVID-19.  Household members should stay in another room or be  from the patient. Household members should use a separate bedroom and bathroom, if available.  Prohibit visitors.  Household members should care for any pets in the home.  Make sure that shared spaces in the home have good air flow, such as by an air conditioner or an opened window, weather permitting.  Perform hand hygiene frequently. Wash your hands often with soap and water for at least 20 seconds or use an alcohol-based hand  (that contains > 60% alcohol) covering all surfaces of your hands and rubbing them together until they feel dry. Soap and water should be used preferentially.  Avoid touching your eyes, nose, and mouth.  The patient should wear a facemask. If the patient is not able to wear a facemask (for example, because it causes trouble breathing), caregivers should wear a mask when they are in the same room as the patient.  Wear a disposable facemask and gloves when you touch or have contact with the patient's blood, stool, or body fluids, such as saliva, sputum, nasal mucus, vomit, urine.  Throw out disposable facemasks and gloves after using them. Do not reuse.  When removing personal protective equipment, first remove and dispose of gloves. Then, immediately clean your hands with soap and water or alcohol-based hand . Next, remove and dispose of facemask, and immediately clean your hands again with soap and water or alcohol-based hand .  You should not share dishes, drinking glasses, cups, eating utensils, towels, bedding, or other items with  the patient. After the patient uses these items, you should wash them thoroughly (see below Wash laundry thoroughly).  Clean all high-touch surfaces, such as counters, tabletops, doorknobs, bathroom fixtures, toilets, phones, keyboards, tablets, and bedside tables, every day. Also, clean any surfaces that may have blood, stool, or body fluids on them.  Use a household cleaning spray or wipe, according to the label instructions. Labels contain instructions for safe and effective use of the cleaning product including precautions you should take when applying the product, such as wearing gloves and making sure you have good ventilation during use of the product.  Wash laundry thoroughly.  Immediately remove and wash clothes or bedding that have blood, stool, or body fluids on them.  Wear disposable gloves while handling soiled items and keep soiled items away from your body. Clean your hands (with soap and water or an alcohol-based hand ) immediately after removing your gloves.  Read and follow directions on labels of laundry or clothing items and detergent. In general, using a normal laundry detergent according to washing machine instructions and dry thoroughly using the warmest temperatures recommended on the clothing label.  Place all used disposable gloves, facemasks, and other contaminated items in a lined container before disposing of them with other household waste. Clean your hands (with soap and water or an alcohol-based hand ) immediately after handling these items. Soap and water should be used preferentially if hands are visibly dirty.  Discuss any additional questions with your state or local health department or healthcare provider. Check available hours when contacting your local health department.    For more information see CDC link below.      https://www.cdc.gov/coronavirus/2019-ncov/hcp/guidance-prevent-spread.html#precautions        Sources:  [facility], [facility]  [facility]          Instructions for Home Care of Patients and Caretakers with Coronavirus Disease 2019  Limit visitors to the home.  Older persons and those that have chronic medical conditions such as diabetes, lung and heart disease are at increased risk for illness.   If possible, patients should use a separate bedroom while recovering. Caregivers and household members should avoid prolonged contact with the patient which means to stay 6 feet away and avoid contact with cough droplets.  When close contact is necessary, wash your hands before and immediately after contact.   Perform hand hygiene frequently. Wash your hands often with soap and water for at least 20 seconds or use an alcohol-based hand , covering all surfaces of your hands and rubbing them together until they feel dry.   Avoid touching your eyes, nose, and mouth with unwashed hands.  Avoid sharing household items with the patient. You should not share dishes, drinking glasses, cups, eating utensils, towels, bedding, or other items. After the patient uses these items, you should wash them thoroughly.  Wash laundry thoroughly.   Immediately remove and wash clothes or bedding that have blood, stool, or body fluids on them.  Clean all high-touch surfaces, such as counters, tabletops, doorknobs, bathroom fixtures, toilets, phones, keyboards, tablets, and bedside tables, every day.   Use a household cleaning spray or wipe, according to the label instructions. Labels contain instructions for safe and effective use of the cleaning product including precautions you should take when applying the product, such as wearing gloves and making sure you have good ventilation during use of the product.    For more information see CDC link below.      https://www.cdc.gov/coronavirus/2019-ncov/hcp/guidance-prevent-spread.html#precautions               If your symptoms worsen or if you have any other concerns, please contact [facility] at  [phone].

## 2022-10-24 ENCOUNTER — OFFICE VISIT (OUTPATIENT)
Dept: OBSTETRICS AND GYNECOLOGY | Facility: CLINIC | Age: 32
End: 2022-10-24
Attending: OBSTETRICS & GYNECOLOGY
Payer: OTHER GOVERNMENT

## 2022-10-24 VITALS
BODY MASS INDEX: 38.44 KG/M2 | WEIGHT: 259.56 LBS | DIASTOLIC BLOOD PRESSURE: 80 MMHG | SYSTOLIC BLOOD PRESSURE: 116 MMHG | HEIGHT: 69 IN

## 2022-10-24 DIAGNOSIS — Z30.41 ORAL CONTRACEPTIVE PILL SURVEILLANCE: ICD-10-CM

## 2022-10-24 DIAGNOSIS — Z12.4 ENCOUNTER FOR PAPANICOLAOU SMEAR FOR CERVICAL CANCER SCREENING: ICD-10-CM

## 2022-10-24 DIAGNOSIS — Z11.51 ENCOUNTER FOR SCREENING FOR HUMAN PAPILLOMAVIRUS (HPV): Primary | ICD-10-CM

## 2022-10-24 DIAGNOSIS — Z01.419 ENCOUNTER FOR GYNECOLOGICAL EXAMINATION (GENERAL) (ROUTINE) WITHOUT ABNORMAL FINDINGS: ICD-10-CM

## 2022-10-24 PROCEDURE — 99999 PR PBB SHADOW E&M-EST. PATIENT-LVL III: ICD-10-PCS | Mod: PBBFAC,,, | Performed by: OBSTETRICS & GYNECOLOGY

## 2022-10-24 PROCEDURE — 88175 CYTOPATH C/V AUTO FLUID REDO: CPT | Performed by: OBSTETRICS & GYNECOLOGY

## 2022-10-24 PROCEDURE — 87624 HPV HI-RISK TYP POOLED RSLT: CPT | Performed by: OBSTETRICS & GYNECOLOGY

## 2022-10-24 PROCEDURE — 99999 PR PBB SHADOW E&M-EST. PATIENT-LVL III: CPT | Mod: PBBFAC,,, | Performed by: OBSTETRICS & GYNECOLOGY

## 2022-10-24 PROCEDURE — 99395 PR PREVENTIVE VISIT,EST,18-39: ICD-10-PCS | Mod: S$GLB,,, | Performed by: OBSTETRICS & GYNECOLOGY

## 2022-10-24 PROCEDURE — 99395 PREV VISIT EST AGE 18-39: CPT | Mod: S$GLB,,, | Performed by: OBSTETRICS & GYNECOLOGY

## 2022-10-24 RX ORDER — NORGESTIMATE AND ETHINYL ESTRADIOL 0.25-0.035
1 KIT ORAL DAILY
Qty: 84 TABLET | Refills: 3 | Status: SHIPPED | OUTPATIENT
Start: 2022-10-24 | End: 2023-11-26

## 2022-10-24 RX ORDER — FLUOXETINE HYDROCHLORIDE 20 MG/1
20 CAPSULE ORAL
COMMUNITY
Start: 2022-08-08 | End: 2023-08-08

## 2022-10-24 NOTE — PROGRESS NOTES
Subjective:       Patient ID: Evelin Tripp is a 32 y.o. female.    Chief Complaint:  Annual Exam (Last pap 2019 normal; period for three weeks )        History of Present Illness  Evelin Tripp is a 32 y.o. female  who presents for annual. Reports on OCP she is having BTB that starts in the second week. So by the end of the third pack she stopped, let the period come out and the restarted OCP and the bleeding stopped. I recommend we try another OCP. Currently on Brea. Had normal GYN u/s in  of this year    Patient's last menstrual period was 2022 (exact date).   Date of Last Pap: 10/24/2022    Review of Systems  Review of Systems   Constitutional:  Negative for chills and fever.      Objective:   Physical Exam:   Constitutional: She is oriented to person, place, and time. Vital signs are normal. She appears well-developed and well-nourished. No distress.        Pulmonary/Chest: She exhibits no mass. Right breast exhibits no mass, no nipple discharge, no skin change, no tenderness, no bleeding and no swelling. Left breast exhibits no mass, no nipple discharge, no skin change, no tenderness, no bleeding and no swelling. Breasts are symmetrical.        Abdominal: Soft. Bowel sounds are normal. She exhibits no distension and no mass. There is no abdominal tenderness. There is no rebound.     Genitourinary:    Vagina and uterus normal.   There is no rash, tenderness, lesion or injury on the right labia. There is no rash, tenderness, lesion or injury on the left labia. Cervix is normal. Right adnexum displays no mass, no tenderness and no fullness. Left adnexum displays no mass, no tenderness and no fullness. No erythema,  no vaginal discharge, tenderness, rectocele, cystocele or unspecified prolapse of vaginal walls in the vagina. Cervix exhibits no motion tenderness, no discharge and no friability. Uterus is not deviated, not enlarged, not fixed, not tender and not hosting fibroids.            Musculoskeletal: Normal range of motion and moves all extremeties.      Lymphadenopathy:        Right: No supraclavicular adenopathy present.        Left: No supraclavicular adenopathy present.    Neurological: She is alert and oriented to person, place, and time.    Skin: Skin is warm and dry.    Psychiatric: She has a normal mood and affect. Her behavior is normal. Judgment normal.      Assessment/ Plan:     1. Encounter for screening for human papillomavirus (HPV)  HPV High Risk Genotypes, PCR      2. Encounter for Papanicolaou smear for cervical cancer screening  Liquid-Based Pap Smear, Screening      3. Oral contraceptive pill surveillance  norgestimate-ethinyl estradioL (ORTHO-CYCLEN) 0.25-35 mg-mcg per tablet      4. Encounter for gynecological examination (general) (routine) without abnormal findings            Follow up in about 1 year (around 10/24/2023) for Annual exam.    As of April 1, 2021, the Cures Act has been passed nationally. This new law requires that all doctors progress notes, lab results, pathology reports and radiology reports be released IMMEDIATELY to the patient in the patient portal. That means that the results are released to you at the EXACT same time they are released to me. Therefore, with all of the patients that I have I am not able to reply to each patient exactly when the results come in. So there will be a delay from when you see the results to when I see them and have time to come up with a response to send you. Also I only see these results when I am on the computer at work. So if the results come in over the weekend or after 5 pm of a work day, I will not see them until the next business day. As you can tell, this is a challenge as a physician to give every patient the quick response they hope for and deserve. So please be patient! Thanks for understanding, Dr. Peters

## 2022-10-27 LAB
HPV HR 12 DNA SPEC QL NAA+PROBE: NEGATIVE
HPV16 AG SPEC QL: NEGATIVE
HPV18 DNA SPEC QL NAA+PROBE: NEGATIVE

## 2022-10-28 LAB
FINAL PATHOLOGIC DIAGNOSIS: NORMAL
Lab: NORMAL

## 2023-10-02 ENCOUNTER — HOSPITAL ENCOUNTER (EMERGENCY)
Facility: HOSPITAL | Age: 33
Discharge: HOME | End: 2023-10-03
Attending: EMERGENCY MEDICINE
Payer: OTHER GOVERNMENT

## 2023-10-02 DIAGNOSIS — M54.50 ACUTE BILATERAL LOW BACK PAIN WITHOUT SCIATICA: ICD-10-CM

## 2023-10-02 DIAGNOSIS — R10.30 LOWER ABDOMINAL PAIN: Primary | ICD-10-CM

## 2023-10-02 DIAGNOSIS — R18.8 FREE FLUID IN PELVIS: ICD-10-CM

## 2023-10-02 LAB
ALBUMIN SERPL BCP-MCNC: 3.1 G/DL (ref 3.5–5.2)
ALP SERPL-CCNC: 82 U/L (ref 55–135)
ALT SERPL W/O P-5'-P-CCNC: 9 U/L (ref 10–44)
ANION GAP SERPL CALC-SCNC: 8 MMOL/L (ref 8–16)
AST SERPL-CCNC: 11 U/L (ref 10–40)
B-HCG UR QL: NEGATIVE
BASOPHILS # BLD AUTO: 0.04 K/UL (ref 0–0.2)
BASOPHILS NFR BLD: 0.4 % (ref 0–1.9)
BILIRUB SERPL-MCNC: 0.2 MG/DL (ref 0.1–1)
BUN SERPL-MCNC: 8 MG/DL (ref 6–20)
CALCIUM SERPL-MCNC: 9.2 MG/DL (ref 8.7–10.5)
CHLORIDE SERPL-SCNC: 104 MMOL/L (ref 95–110)
CO2 SERPL-SCNC: 26 MMOL/L (ref 23–29)
CREAT SERPL-MCNC: 0.8 MG/DL (ref 0.5–1.4)
CTP QC/QA: YES
DIFFERENTIAL METHOD: NORMAL
EOSINOPHIL # BLD AUTO: 0.1 K/UL (ref 0–0.5)
EOSINOPHIL NFR BLD: 0.9 % (ref 0–8)
ERYTHROCYTE [DISTWIDTH] IN BLOOD BY AUTOMATED COUNT: 13.4 % (ref 11.5–14.5)
EST. GFR  (NO RACE VARIABLE): >60 ML/MIN/1.73 M^2
GLUCOSE SERPL-MCNC: 115 MG/DL (ref 70–110)
HCT VFR BLD AUTO: 39.2 % (ref 37–48.5)
HGB BLD-MCNC: 12.7 G/DL (ref 12–16)
IMM GRANULOCYTES # BLD AUTO: 0.03 K/UL (ref 0–0.04)
IMM GRANULOCYTES NFR BLD AUTO: 0.3 % (ref 0–0.5)
LIPASE SERPL-CCNC: 19 U/L (ref 4–60)
LYMPHOCYTES # BLD AUTO: 2.2 K/UL (ref 1–4.8)
LYMPHOCYTES NFR BLD: 21.3 % (ref 18–48)
MCH RBC QN AUTO: 27.9 PG (ref 27–31)
MCHC RBC AUTO-ENTMCNC: 32.4 G/DL (ref 32–36)
MCV RBC AUTO: 86 FL (ref 82–98)
MONOCYTES # BLD AUTO: 0.6 K/UL (ref 0.3–1)
MONOCYTES NFR BLD: 5.8 % (ref 4–15)
NEUTROPHILS # BLD AUTO: 7.4 K/UL (ref 1.8–7.7)
NEUTROPHILS NFR BLD: 71.3 % (ref 38–73)
NRBC BLD-RTO: 0 /100 WBC
PLATELET # BLD AUTO: 350 K/UL (ref 150–450)
PMV BLD AUTO: 10.8 FL (ref 9.2–12.9)
POTASSIUM SERPL-SCNC: 4.3 MMOL/L (ref 3.5–5.1)
PROT SERPL-MCNC: 6.8 G/DL (ref 6–8.4)
RBC # BLD AUTO: 4.56 M/UL (ref 4–5.4)
SODIUM SERPL-SCNC: 138 MMOL/L (ref 136–145)
WBC # BLD AUTO: 10.41 K/UL (ref 3.9–12.7)

## 2023-10-02 PROCEDURE — 99285 EMERGENCY DEPT VISIT HI MDM: CPT | Mod: 25

## 2023-10-02 PROCEDURE — 85025 COMPLETE CBC W/AUTO DIFF WBC: CPT | Performed by: PHYSICIAN ASSISTANT

## 2023-10-02 PROCEDURE — 81001 URINALYSIS AUTO W/SCOPE: CPT | Performed by: EMERGENCY MEDICINE

## 2023-10-02 PROCEDURE — 63600175 PHARM REV CODE 636 W HCPCS: Performed by: PHYSICIAN ASSISTANT

## 2023-10-02 PROCEDURE — 87086 URINE CULTURE/COLONY COUNT: CPT | Performed by: EMERGENCY MEDICINE

## 2023-10-02 PROCEDURE — 83690 ASSAY OF LIPASE: CPT | Performed by: PHYSICIAN ASSISTANT

## 2023-10-02 PROCEDURE — 81025 URINE PREGNANCY TEST: CPT | Performed by: EMERGENCY MEDICINE

## 2023-10-02 PROCEDURE — 87147 CULTURE TYPE IMMUNOLOGIC: CPT | Performed by: EMERGENCY MEDICINE

## 2023-10-02 PROCEDURE — 80053 COMPREHEN METABOLIC PANEL: CPT | Performed by: PHYSICIAN ASSISTANT

## 2023-10-02 PROCEDURE — 96374 THER/PROPH/DIAG INJ IV PUSH: CPT

## 2023-10-02 PROCEDURE — 87088 URINE BACTERIA CULTURE: CPT | Performed by: EMERGENCY MEDICINE

## 2023-10-02 RX ORDER — KETOROLAC TROMETHAMINE 30 MG/ML
10 INJECTION, SOLUTION INTRAMUSCULAR; INTRAVENOUS
Status: COMPLETED | OUTPATIENT
Start: 2023-10-02 | End: 2023-10-02

## 2023-10-02 RX ORDER — ONDANSETRON 2 MG/ML
4 INJECTION INTRAMUSCULAR; INTRAVENOUS
Status: DISCONTINUED | OUTPATIENT
Start: 2023-10-02 | End: 2023-10-02

## 2023-10-02 RX ADMIN — KETOROLAC TROMETHAMINE 10 MG: 30 INJECTION INTRAMUSCULAR; INTRAVENOUS at 11:10

## 2023-10-03 ENCOUNTER — PATIENT MESSAGE (OUTPATIENT)
Dept: OBSTETRICS AND GYNECOLOGY | Facility: CLINIC | Age: 33
End: 2023-10-03
Payer: OTHER GOVERNMENT

## 2023-10-03 VITALS
WEIGHT: 259 LBS | DIASTOLIC BLOOD PRESSURE: 80 MMHG | HEART RATE: 78 BPM | RESPIRATION RATE: 16 BRPM | OXYGEN SATURATION: 98 % | SYSTOLIC BLOOD PRESSURE: 130 MMHG | BODY MASS INDEX: 38.25 KG/M2 | TEMPERATURE: 98 F

## 2023-10-03 DIAGNOSIS — R10.2 PELVIC PAIN IN FEMALE: Primary | ICD-10-CM

## 2023-10-03 LAB
BACTERIA #/AREA URNS AUTO: ABNORMAL /HPF
BACTERIA GENITAL QL WET PREP: ABNORMAL
BILIRUB UR QL STRIP: NEGATIVE
C TRACH DNA SPEC QL NAA+PROBE: NOT DETECTED
CLARITY UR REFRACT.AUTO: ABNORMAL
CLUE CELLS VAG QL WET PREP: ABNORMAL
COLOR UR AUTO: YELLOW
FILAMENT FUNGI VAG WET PREP-#/AREA: ABNORMAL
GLUCOSE UR QL STRIP: NEGATIVE
HGB UR QL STRIP: ABNORMAL
KETONES UR QL STRIP: NEGATIVE
LEUKOCYTE ESTERASE UR QL STRIP: ABNORMAL
MICROSCOPIC COMMENT: ABNORMAL
N GONORRHOEA DNA SPEC QL NAA+PROBE: NOT DETECTED
NITRITE UR QL STRIP: POSITIVE
PH UR STRIP: 7 [PH] (ref 5–8)
PROT UR QL STRIP: ABNORMAL
RBC #/AREA URNS AUTO: 3 /HPF (ref 0–4)
SP GR UR STRIP: 1.03 (ref 1–1.03)
SPECIMEN SOURCE: ABNORMAL
SQUAMOUS #/AREA URNS AUTO: 17 /HPF
T VAGINALIS GENITAL QL WET PREP: ABNORMAL
URN SPEC COLLECT METH UR: ABNORMAL
WBC #/AREA URNS AUTO: 11 /HPF (ref 0–5)
WBC #/AREA VAG WET PREP: ABNORMAL
YEAST GENITAL QL WET PREP: ABNORMAL

## 2023-10-03 PROCEDURE — 25500020 PHARM REV CODE 255: Performed by: EMERGENCY MEDICINE

## 2023-10-03 PROCEDURE — 87591 N.GONORRHOEAE DNA AMP PROB: CPT | Performed by: EMERGENCY MEDICINE

## 2023-10-03 PROCEDURE — 87210 SMEAR WET MOUNT SALINE/INK: CPT | Performed by: EMERGENCY MEDICINE

## 2023-10-03 PROCEDURE — 87491 CHLMYD TRACH DNA AMP PROBE: CPT | Performed by: EMERGENCY MEDICINE

## 2023-10-03 RX ORDER — HYDROCODONE BITARTRATE AND ACETAMINOPHEN 5; 325 MG/1; MG/1
1 TABLET ORAL EVERY 4 HOURS PRN
Qty: 18 TABLET | Refills: 0 | Status: SHIPPED | OUTPATIENT
Start: 2023-10-03 | End: 2023-10-04 | Stop reason: RX

## 2023-10-03 RX ORDER — ONDANSETRON 4 MG/1
4 TABLET, FILM COATED ORAL EVERY 6 HOURS
Qty: 10 TABLET | Refills: 0 | Status: SHIPPED | OUTPATIENT
Start: 2023-10-03 | End: 2023-11-17

## 2023-10-03 RX ADMIN — IOHEXOL 100 ML: 350 INJECTION, SOLUTION INTRAVENOUS at 01:10

## 2023-10-03 NOTE — DISCHARGE INSTRUCTIONS
We know that you have many choices and are honored that you chose us. We hope that we met or exceeded your expectations and goals for this visit and will keep the [facility] family in mind for your future needs and those of your family and friends.     - Dr. Wells

## 2023-10-03 NOTE — FIRST PROVIDER EVALUATION
Emergency Department TeleTriage Encounter Note      CHIEF COMPLAINT    Chief Complaint   Patient presents with    Back Pain     Bilateral lower back/side pain x 2 days. Pain started in lower abdomen/pelvic area today. Reports having an episode of vomiting from the pain.        VITAL SIGNS   Initial Vitals   BP Pulse Resp Temp SpO2   10/02/23 2213 10/02/23 2213 10/02/23 2213 10/02/23 2214 10/02/23 2213   133/89 87 15 98.6 °F (37 °C) 98 %      MAP       --                   ALLERGIES    Review of patient's allergies indicates:   Allergen Reactions    Percocet [oxycodone-acetaminophen] Itching    Zithromax [azithromycin] Hives    Duloxetine Nausea And Vomiting       PROVIDER TRIAGE NOTE  33-year-old female presents with lower abdominal pain that began shortly prior to arrival.  Patient reports stabbing pain with nausea and vomiting, pain has improved.  Vital signs normal, no distress noted on assessment.      ORDERS  Labs Reviewed   URINALYSIS, REFLEX TO URINE CULTURE   POCT URINE PREGNANCY       ED Orders (720h ago, onward)      Start Ordered     Status Ordering Provider    10/02/23 2232 10/02/23 2231  Urinalysis, Reflex to Urine Culture Urine, Clean Catch  STAT         In process PINEDA ZAMBRANO    10/02/23 2232 10/02/23 2231  POCT urine pregnancy  Once         Final result PINEDA ZAMBRANO              Virtual Visit Note: The provider triage portion of this emergency department evaluation and documentation was performed via [employer], a HIPAA-compliant telemedicine application, in concert with a tele-presenter in the room. A face to face patient evaluation with one of my colleagues will occur once the patient is placed in an emergency department room.      DISCLAIMER: This note was prepared with [employer] voice recognition transcription software. Garbled syntax, mangled pronouns, and other bizarre constructions may be attributed to that software system.

## 2023-10-03 NOTE — ED NOTES
Bed: [facility]  Expected date:   Expected time:   Means of arrival:   Comments:  intake

## 2023-10-03 NOTE — ED TRIAGE NOTES
Evelin Hansenthu, an 33 y.o. female presents to the ED from home with 3/10 cramping lower abd pain sin 2100 this evening with N/V    Chief Complaint   Patient presents with    Back Pain     Bilateral lower back/side pain x 2 days. Pain started in lower abdomen/pelvic area today. Reports having an episode of vomiting from the pain.      Review of patient's allergies indicates:   Allergen Reactions    Percocet [oxycodone-acetaminophen] Itching    Zithromax [azithromycin] Hives    Duloxetine Nausea And Vomiting     No past medical history on file.

## 2023-10-03 NOTE — ED PROVIDER NOTES
Encounter Date: 10/2/2023       History     Chief Complaint   Patient presents with    Back Pain     Bilateral lower back/side pain x 2 days. Pain started in lower abdomen/pelvic area today. Reports having an episode of vomiting from the pain.      This history was obtained from the patient without limitations.  She is a 33-year-old with the below past medical history presents by personal transportation.  She complains of back pain and abdominal pain.  She developed bilateral lower back pain 2 days ago.  That pain has been intermittent and without exacerbating or alleviating factors.  She describes it as a dull discomfort.  It is not affected by movement.  Earlier today she has sudden onset of severe lower abdominal discomfort that she is unable to characterize.  She has associated nausea and vomited once.  The pain has improved significantly but not resolved.  She has increased urinary frequency.  She denies fever, chills, dysuria, diarrhea, and constipation.  She has taken no medications to attempt treatment for her symptoms.        Review of patient's allergies indicates:   Allergen Reactions    Percocet [oxycodone-acetaminophen] Itching    Zithromax [azithromycin] Hives    Duloxetine Nausea And Vomiting     History reviewed. No pertinent past medical history.  Past Surgical History:   Procedure Laterality Date    imbilical hernia       UMBILICAL HERNIA REPAIR       Family History   Problem Relation Age of Onset    No Known Problems Father     No Known Problems Mother     Ovarian cancer Maternal Aunt     Breast cancer Maternal Aunt     Colon cancer Neg Hx      Social History     Tobacco Use    Smoking status: Former     Passive exposure: Current (vape with nicotine)    Smokeless tobacco: Never   Substance Use Topics    Alcohol use: Yes     Comment: once/month    Drug use: No     Physical Exam     Initial Vitals   BP Pulse Resp Temp SpO2   10/02/23 2213 10/02/23 2213 10/02/23 2213 10/02/23 2214 10/02/23 2213    133/89 87 15 98.6 °F (37 °C) 98 %      MAP       --                Physical Exam    Nursing note and vitals reviewed.  Constitutional: She is not diaphoretic. No distress.   Eyes: Conjunctivae are normal. No scleral icterus.   Cardiovascular:  Normal rate, regular rhythm and normal heart sounds.     Exam reveals no gallop and no friction rub.       No murmur heard.  Pulmonary/Chest: Effort normal and breath sounds normal. No respiratory distress. She has no decreased breath sounds. She has no wheezes. She has no rhonchi. She has no rales.   Abdominal: Abdomen is soft. She exhibits no distension. There is abdominal tenderness in the right lower quadrant, suprapubic area and left lower quadrant.   No right CVA tenderness.  No left CVA tenderness. There is no rebound and no guarding.   Genitourinary:    Genitourinary Comments: Normal external anatomy.  No vaginal blood.  Normal appearing cervix.  Small amount of yellow cervical discharge.  No adnexal fullness or tenderness.  No cervical motion tenderness.     Musculoskeletal:      Thoracic back: No swelling, deformity, spasms or tenderness.      Lumbar back: Tenderness present. No swelling, deformity, spasms or bony tenderness. Negative right straight leg raise test and negative left straight leg raise test.      Comments: Lumbar spine:  Nonfocal bilateral tenderness.  No midline tenderness.     Neurological: She is alert and oriented to person, place, and time. GCS score is 15. GCS eye subscore is 4. GCS verbal subscore is 5. GCS motor subscore is 6.   Skin: Skin is warm and dry. No pallor.         ED Course   Procedures  Labs Reviewed   CULTURE, URINE - Abnormal; Notable for the following components:       Result Value    Urine Culture, Routine   (*)     Value: STREPTOCOCCUS AGALACTIAE (GROUP B)  50,000 - 99,999 cfu/ml  In case of Penicillin allergy, call lab for further testing.  Beta-hemolytic streptococci are routinely susceptible to   penicillins,cephalosporins  and carbapenems.  No other significant isolate      All other components within normal limits    Narrative:     Specimen Source->Urine   VAGINAL SCREEN - Abnormal; Notable for the following components:    Clue Cells Rare (*)     WBC - Vaginal Screen Rare (*)     Bacteria - Vaginal Screen Rare (*)     All other components within normal limits    Narrative:     Release to patient->Immediate   URINALYSIS, REFLEX TO URINE CULTURE - Abnormal; Notable for the following components:    Appearance, UA Hazy (*)     Protein, UA Trace (*)     Occult Blood UA Trace (*)     Nitrite, UA Positive (*)     Leukocytes, UA 1+ (*)     All other components within normal limits    Narrative:     Specimen Source->Urine   COMPREHENSIVE METABOLIC PANEL - Abnormal; Notable for the following components:    Glucose 115 (*)     Albumin 3.1 (*)     ALT 9 (*)     All other components within normal limits   URINALYSIS MICROSCOPIC - Abnormal; Notable for the following components:    WBC, UA 11 (*)     All other components within normal limits    Narrative:     Specimen Source->Urine   C. TRACHOMATIS/N. GONORRHOEAE BY AMP DNA    Narrative:     Sources by Resulting Lab:->[facility]  Release to patient->Immediate   CBC W/ AUTO DIFFERENTIAL   LIPASE   POCT URINE PREGNANCY          Imaging Results              CT Abdomen Pelvis With Contrast (Edited Result - FINAL)  Result time 10/03/23 17:42:05      Addendum (preliminary) 1 of 1 by Cassius Quesada MD (10/03/23 17:42:05)      SUZANNA Quesada MD, first observed the critical findings on 10/03/2023 at 05:30, and reported the results, via epic secure chat message, to Colin Wells III, MD, on 10/03/2023 at 05:38.  Patient name and medical record number were specified/linked in the message.  The messaging system provided confirmation that the message was promptly opened and seen by the recipient.      Electronically signed by: Cassius Quesada  Date:    10/03/2023  Time:    17:42                 Final result by  Cassius Quesada MD (10/03/23 05:38:32)                   Impression:      ABDOMEN CT:    Small hiatal hernia.    Hepatosplenomegaly.    Normal-sized appendix.    No hydronephrosis, urolithiasis or pyelonephritis apparent.    PELVIC CT:    Small quantity of intraperitoneal fluid, mainly in the pelvis.  This appears to envelop the uterus and both adnexa, in a fashion which may be seen with PID.  However this requires clinical correlation.    Additional findings as detailed in the body of the report above.    This report was flagged in Epic as abnormal.      Electronically signed by: Cassius Quesada  Date:    10/03/2023  Time:    05:38               Narrative:    EXAMINATION:  CT ABDOMEN PELVIS WITH CONTRAST    CLINICAL HISTORY:  RLQ abdominal pain (Age >= 14y);    TECHNIQUE:  Low dose axial images, sagittal and coronal reformations were obtained from the lung bases to the pubic symphysis following the IV administration of 100 mL of Omnipaque 350.    COMPARISON:  No prior CT.  Limited correlation is made with the pelvic sonogram of 06/24/2022.    FINDINGS:  Abdomen CT:    Small hiatal hernia.  Otherwise the visualized lower thorax demonstrates no acute CT abnormality.    Hepatomegaly.  Main portal vein diameter 15 mm.    Gallbladder and bile ducts: No acute CT abnormality.    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    Enlarged spleen.    Normal CT appearance of the bilateral adrenals and bilateral kidneys.    Stomach and duodenum or poorly distended, limiting assessment for mural or mucosal abnormalities.  Normal course of the duodenal C-loop.  Some small bowel loops project lateral to the descending colon, possibly in relation to an internal hernia.  There is no evidence of associated bowel obstruction.  No acute CT abnormalities of the colon.    Appendix: Retrocolic, normal diameter, and terminating between the liver and duodenum.  There is some  trace periappendiceal fluid, but this is NOT thought to originate from the appendix.    Peritoneal cavity: Trace free intraperitoneal fluid adjacent to the caudal portions of the liver and spleen and in the paracolic gutters.  There is a small but more relatively more prominent accumulation of intraperitoneal fluid in the pelvis, where the fluid appears to envelop both the anterior and posterior aspects of the uterus and bilateral adnexa.    No discrete uterine or adnexal masses.    Abdominal aorta and IVC demonstrate normal course and caliber.    Lymph nodes: By size criteria, no abdominal or pelvic lymphadenopathy is demonstrated.    Urinary bladder: Poorly distended.    Body wall: Fat containing umbilical/periumbilical hernias.  These demonstrate no acute CT abnormality.    Musculoskeletal: No acute CT abnormality.  No advanced degenerative changes.                                       Medications   ketorolac injection 9.999 mg (9.999 mg Intravenous Given 10/2/23 2347)   iohexoL (OMNIPAQUE 350) injection 100 mL (100 mLs Intravenous Given 10/3/23 0126)     Medical Decision Making  Amount and/or Complexity of Data Reviewed  Labs: ordered.  Radiology: ordered.    Risk  Prescription drug management.               ED Course as of 10/07/23 0036   Mon Oct 02, 2023   2335 Workup/treatment plan:  Evaluation for abdominal pain and back pain.  Obtaining labs (pregnancy test, UA, CBC, CMP, lipase) and contrasted CT of the abdomen and pelvis.  Differential diagnoses:  Normal pregnancy, ectopic pregnancy, appendicitis, diverticulitis, cystitis, pyelonephritis, bowel obstruction, and other conditions.  Administered IV pain medication.  We will perform serial exams. [LP]      ED Course User Index  [LP] Colin Wells III, MD                    Clinical Impression:   Final diagnoses:  [R10.30] Lower abdominal pain (Primary)  [M54.50] Acute bilateral low back pain without sciatica  [R18.8] Free fluid in pelvis        ED  Disposition Condition    Discharge Stable          ED Prescriptions       Medication Sig Dispense Start Date End Date Auth. Provider    HYDROcodone-acetaminophen (NORCO) 5-325 mg per tablet () Take 1 tablet by mouth every 4 (four) hours as needed (Moderate to severe pain). 18 tablet 10/3/2023 10/4/2023 Colin Wells III, MD    ondansetron (ZOFRAN) 4 MG tablet Take 1 tablet (4 mg total) by mouth every 6 (six) hours. Patient not taking:  Reported on 10/6/2023 10 tablet 10/3/2023 -- Colin Wells III, MD          Follow-up Information       Follow up With Specialties Details Why Contact Info    Your gynecologist   Schedule a close ER follow-up visit.     ER   Return to the ER for worsened symptoms or any concerns needing immediate attention.              Colin Wells III, MD  10/07/23 0036

## 2023-10-04 ENCOUNTER — TELEPHONE (OUTPATIENT)
Dept: EMERGENCY MEDICINE | Facility: HOSPITAL | Age: 33
End: 2023-10-04
Payer: OTHER GOVERNMENT

## 2023-10-04 LAB
BACTERIA UR CULT: ABNORMAL

## 2023-10-04 RX ORDER — HYDROCODONE BITARTRATE AND ACETAMINOPHEN 5; 325 MG/1; MG/1
1 TABLET ORAL EVERY 4 HOURS PRN
Qty: 18 TABLET | Refills: 0 | Status: SHIPPED | OUTPATIENT
Start: 2023-10-04 | End: 2023-11-17

## 2023-10-04 RX ORDER — CEPHALEXIN 500 MG/1
500 CAPSULE ORAL EVERY 12 HOURS
Qty: 14 CAPSULE | Refills: 0 | Status: SHIPPED | OUTPATIENT
Start: 2023-10-04 | End: 2023-10-11

## 2023-10-04 NOTE — TELEPHONE ENCOUNTER
Contacted patient and discussed urine culture results.  She was not discharged with antibiotics.  Prescription for Keflex sent to her preferred pharmacy.  Presentation does not appear consistent with pyelonephritis based on documentation.  Will prescribe Keflex which is appropriate coverage for strep.  Additionally, patient was discharged with Basalt from the ED.  She states that the [facility] did not have this medication in stock.  I have canceled the [facility] prescription and recent the prescription to another pharmacy with the Keflex.

## 2023-10-06 ENCOUNTER — OFFICE VISIT (OUTPATIENT)
Dept: OBSTETRICS AND GYNECOLOGY | Facility: CLINIC | Age: 33
End: 2023-10-06
Payer: OTHER GOVERNMENT

## 2023-10-06 VITALS — SYSTOLIC BLOOD PRESSURE: 118 MMHG | WEIGHT: 293 LBS | BODY MASS INDEX: 43.3 KG/M2 | DIASTOLIC BLOOD PRESSURE: 70 MMHG

## 2023-10-06 DIAGNOSIS — N83.209 RUPTURED OVARIAN CYST: Primary | ICD-10-CM

## 2023-10-06 DIAGNOSIS — R10.2 PELVIC PAIN IN FEMALE: ICD-10-CM

## 2023-10-06 PROCEDURE — 99213 PR OFFICE/OUTPT VISIT, EST, LEVL III, 20-29 MIN: ICD-10-PCS | Mod: S$GLB,,, | Performed by: NURSE PRACTITIONER

## 2023-10-06 PROCEDURE — 99213 OFFICE O/P EST LOW 20 MIN: CPT | Mod: S$GLB,,, | Performed by: NURSE PRACTITIONER

## 2023-10-06 PROCEDURE — 99999 PR PBB SHADOW E&M-EST. PATIENT-LVL III: ICD-10-PCS | Mod: PBBFAC,,, | Performed by: NURSE PRACTITIONER

## 2023-10-06 PROCEDURE — 99999 PR PBB SHADOW E&M-EST. PATIENT-LVL III: CPT | Mod: PBBFAC,,, | Performed by: NURSE PRACTITIONER

## 2023-10-06 RX ORDER — LISDEXAMFETAMINE DIMESYLATE 30 MG/1
CAPSULE ORAL
COMMUNITY
Start: 2023-08-28 | End: 2023-11-17

## 2023-10-06 RX ORDER — ASPIRIN 325 MG
TABLET, DELAYED RELEASE (ENTERIC COATED) ORAL
COMMUNITY
Start: 2023-10-03

## 2023-10-06 NOTE — PROGRESS NOTES
History & Physical  Gynecology      SUBJECTIVE:     Chief Complaint: Pelvic Pain       History of Present Illness: Patient presents to clinic with her mother for ED follow up. Presented to [facility] ED on 10/2 due to severe RLQ abdominal pain, episode of vomiting at home with onset of pain, rated 10/10. CT ABD/Pelvis with noted free fluid at lower pelvis, treated for UTI with Keflex. Pain has gradually subsided over the past several days. Rates pain 2/10 today. Light vaginal bleeding on Wednesday and Thursday. No reported fever/chills, body aches, nausea. Compliant with OCP.       Review of patient's allergies indicates:   Allergen Reactions    Percocet [oxycodone-acetaminophen] Itching    Zithromax [azithromycin] Hives    Duloxetine Nausea And Vomiting       History reviewed. No pertinent past medical history.  Past Surgical History:   Procedure Laterality Date    imbilical hernia       UMBILICAL HERNIA REPAIR       OB History          1    Para   1    Term   1       0    AB   0    Living   1         SAB   0    IAB   0    Ectopic   0    Multiple   0    Live Births   1               Family History   Problem Relation Age of Onset    No Known Problems Father     No Known Problems Mother     Ovarian cancer Maternal Aunt     Breast cancer Maternal Aunt     Colon cancer Neg Hx      Social History     Tobacco Use    Smoking status: Former     Passive exposure: Current (vape with nicotine)    Smokeless tobacco: Never   Substance Use Topics    Alcohol use: Yes     Comment: once/month    Drug use: No       Current Outpatient Medications   Medication Sig    cephALEXin (KEFLEX) 500 MG capsule Take 1 capsule (500 mg total) by mouth every 12 (twelve) hours. for 7 days    cholecalciferol, vitamin D3, 1,250 mcg (50,000 unit) capsule TAKE 1 CAPSULE BY MOUTH ONE TIME PER WEEK FOR 30 DAYS    lisdexamfetamine (VYVANSE) 30 MG capsule 1 capsule in the morning Orally Once a day for 30 days    norgestimate-ethinyl estradioL  (ORTHO-CYCLEN) 0.25-35 mg-mcg per tablet Take 1 tablet by mouth once daily.    HYDROcodone-acetaminophen (NORCO) 5-325 mg per tablet Take 1 tablet by mouth every 4 (four) hours as needed for Pain. No alcohol, no driving, no operating machinery, no working, no swimming, no extra Tylenol (acetaminophen) while taking this medication. (Patient not taking: Reported on 10/6/2023)    ondansetron (ZOFRAN) 4 MG tablet Take 1 tablet (4 mg total) by mouth every 6 (six) hours. (Patient not taking: Reported on 10/6/2023)     No current facility-administered medications for this visit.         Review of Systems:  Review of Systems   Constitutional:  Negative for activity change, appetite change, chills, fatigue and fever.   HENT:  Negative for mouth sores.    Eyes:  Negative for visual disturbance.   Respiratory:  Negative for cough and shortness of breath.    Cardiovascular:  Negative for chest pain and leg swelling.   Gastrointestinal:  Negative for abdominal pain, constipation, diarrhea, nausea, vomiting and reflux.   Endocrine: Negative for hyperthyroidism and hypothyroidism.   Genitourinary:  Positive for pelvic pain and vaginal bleeding. Negative for dysuria, flank pain, frequency, genital sores, hematuria, menstrual problem, vaginal discharge, vaginal pain, vaginal dryness and vaginal odor.   Musculoskeletal:  Negative for arthralgias, back pain and myalgias.   Integumentary:  Negative for rash, breast mass and breast tenderness.   Neurological:  Negative for headaches.   Hematological:  Negative for adenopathy. Does not bruise/bleed easily.   Psychiatric/Behavioral:  Negative for depression. The patient is not nervous/anxious.    Breast: Negative for asymmetry, mass and tenderness       OBJECTIVE:     Physical Exam:  Physical Exam  Constitutional:       General: She is not in acute distress.     Appearance: She is well-developed. She is not diaphoretic.   HENT:      Head: Normocephalic and atraumatic.      Nose: Nose  normal.   Eyes:      Conjunctiva/sclera: Conjunctivae normal.      Pupils: Pupils are equal, round, and reactive to light.   Neck:      Thyroid: No thyromegaly.      Vascular: No JVD.      Trachea: No tracheal deviation.   Cardiovascular:      Rate and Rhythm: Normal rate and regular rhythm.      Heart sounds: Normal heart sounds. No murmur heard.     No friction rub. No gallop.   Pulmonary:      Effort: Pulmonary effort is normal. No respiratory distress.      Breath sounds: Normal breath sounds. No stridor. No wheezing or rales.   Chest:      Chest wall: No tenderness.   Abdominal:      General: Bowel sounds are normal. There is no distension.      Palpations: Abdomen is soft. There is no mass.      Tenderness: There is no abdominal tenderness. There is no guarding or rebound.      Hernia: No hernia is present.   Genitourinary:     General: Normal vulva.      Vagina: Normal.      Cervix: Normal.      Uterus: Normal. Not tender.       Adnexa: Right adnexa normal and left adnexa normal.        Right: No tenderness or fullness.          Left: No tenderness or fullness.     Musculoskeletal:         General: No tenderness. Normal range of motion.      Cervical back: Normal range of motion and neck supple.   Lymphadenopathy:      Cervical: No cervical adenopathy.   Skin:     General: Skin is warm and dry.      Capillary Refill: Capillary refill takes less than 2 seconds.      Coloration: Skin is not pale.      Findings: No erythema or rash.   Neurological:      Mental Status: She is alert and oriented to person, place, and time.      Sensory: No sensory deficit.      Motor: No abnormal muscle tone.      Coordination: Coordination normal.      Deep Tendon Reflexes: Reflexes normal.   Psychiatric:         Behavior: Behavior normal.         Thought Content: Thought content normal.         Judgment: Judgment normal.           ASSESSMENT:       ICD-10-CM ICD-9-CM    1. Ruptured ovarian cyst  N83.209 620.2       2. Pelvic  pain in female  R10.2 625.9 US Pelvis Comp with Transvag NON-OB (xpd             Plan:      Pelvic US indicative of ruptured right ovarian cyst. Discussed findings and expected improvement of symptoms gradually over next week. Plan for FU pelvic US and visit in 6-8 weeks for reassessment of complex ovarian cyst.    FU with NP as needed.    Counseling time: 20 minutes       Judith Grace, NANCY-C

## 2023-11-17 ENCOUNTER — OFFICE VISIT (OUTPATIENT)
Dept: OBSTETRICS AND GYNECOLOGY | Facility: CLINIC | Age: 33
End: 2023-11-17
Attending: OBSTETRICS & GYNECOLOGY
Payer: OTHER GOVERNMENT

## 2023-11-17 VITALS
HEART RATE: 73 BPM | BODY MASS INDEX: 42.12 KG/M2 | DIASTOLIC BLOOD PRESSURE: 83 MMHG | WEIGHT: 284.38 LBS | HEIGHT: 69 IN | SYSTOLIC BLOOD PRESSURE: 114 MMHG

## 2023-11-17 DIAGNOSIS — Z01.419 ENCOUNTER FOR GYNECOLOGICAL EXAMINATION (GENERAL) (ROUTINE) WITHOUT ABNORMAL FINDINGS: Primary | ICD-10-CM

## 2023-11-17 PROCEDURE — 99999 PR PBB SHADOW E&M-EST. PATIENT-LVL III: ICD-10-PCS | Mod: PBBFAC,,, | Performed by: OBSTETRICS & GYNECOLOGY

## 2023-11-17 PROCEDURE — 99395 PR PREVENTIVE VISIT,EST,18-39: ICD-10-PCS | Mod: S$GLB,,, | Performed by: OBSTETRICS & GYNECOLOGY

## 2023-11-17 PROCEDURE — 99999 PR PBB SHADOW E&M-EST. PATIENT-LVL III: CPT | Mod: PBBFAC,,, | Performed by: OBSTETRICS & GYNECOLOGY

## 2023-11-17 PROCEDURE — 99395 PREV VISIT EST AGE 18-39: CPT | Mod: S$GLB,,, | Performed by: OBSTETRICS & GYNECOLOGY

## 2023-11-17 RX ORDER — LISDEXAMFETAMINE DIMESYLATE 50 MG/1
CAPSULE ORAL
COMMUNITY
Start: 2023-10-14

## 2023-11-17 NOTE — PROGRESS NOTES
Subjective:       Patient ID: Evelin Tripp is a 33 y.o. female.    Chief Complaint:  Annual Exam (Encounter for routine gynecological examination with well woman)        History of Present Illness  Evelin Tripp is a 33 y.o. female  who presents for annual. No pelvic pain. Periods normal. U/s today shows resolution of previous cyst. Mom (My patient) is about to have breast biopsy- her brother has BrCA gene and her sister  of breast cancer. She has not yet done genetic testing but plans to. Asking about screening for Evelin. I explained will start at age 35. All questions answered.     Patient's last menstrual period was 2023 (exact date).   Date of Last Pap: 10/28/2022    Review of Systems  Review of Systems   Constitutional:  Negative for chills and fever.        Objective:   Physical Exam:   Constitutional: She is oriented to person, place, and time. Vital signs are normal. She appears well-developed and well-nourished. No distress.        Pulmonary/Chest: She exhibits no mass. Right breast exhibits no mass, no nipple discharge, no skin change, no tenderness, no bleeding and no swelling. Left breast exhibits no mass, no nipple discharge, no skin change, no tenderness, no bleeding and no swelling. Breasts are symmetrical.        Abdominal: Soft. Bowel sounds are normal. She exhibits no distension and no mass. There is no abdominal tenderness. There is no rebound.     Genitourinary:    Vagina and uterus normal.   There is no rash, tenderness, lesion or injury on the right labia. There is no rash, tenderness, lesion or injury on the left labia. Cervix is normal. Right adnexum displays no mass, no tenderness and no fullness. Left adnexum displays no mass, no tenderness and no fullness. No erythema,  no vaginal discharge, tenderness, rectocele, cystocele or unspecified prolapse of vaginal walls in the vagina. Cervix exhibits no motion tenderness, no discharge and no friability. Uterus is not  deviated, not enlarged, not fixed, not tender and not hosting fibroids.           Musculoskeletal: Normal range of motion and moves all extremeties.      Lymphadenopathy:        Right: No supraclavicular adenopathy present.        Left: No supraclavicular adenopathy present.    Neurological: She is alert and oriented to person, place, and time.    Skin: Skin is warm and dry.    Psychiatric: She has a normal mood and affect. Her behavior is normal. Judgment normal.        Assessment/ Plan:     1. Encounter for gynecological examination (general) (routine) without abnormal findings            No follow-ups on file.    As of April 1, 2021, the Cures Act has been passed nationally. This new law requires that all doctors progress notes, lab results, pathology reports and radiology reports be released IMMEDIATELY to the patient in the patient portal. That means that the results are released to you at the EXACT same time they are released to me. Therefore, with all of the patients that I have I am not able to reply to each patient exactly when the results come in. So there will be a delay from when you see the results to when I see them and have time to come up with a response to send you. Also I only see these results when I am on the computer at work. So if the results come in over the weekend or after 5 pm of a work day, I will not see them until the next business day. As you can tell, this is a challenge as a physician to give every patient the quick response they hope for and deserve. So please be patient! Thanks for understanding, Dr. Peters

## 2023-11-26 DIAGNOSIS — Z30.41 ORAL CONTRACEPTIVE PILL SURVEILLANCE: ICD-10-CM

## 2023-11-26 RX ORDER — NORGESTIMATE AND ETHINYL ESTRADIOL 0.25-0.035
1 KIT ORAL
Qty: 84 TABLET | Refills: 3 | Status: SHIPPED | OUTPATIENT
Start: 2023-11-26

## 2023-11-26 NOTE — TELEPHONE ENCOUNTER
Refill Decision Note   Evelin Aldrichnir  is requesting a refill authorization.  Brief Assessment and Rationale for Refill:  Approve     Medication Therapy Plan:       Medication Reconciliation Completed: No   Comments:     No Care Gaps recommended.     Note composed:12:50 PM 11/26/2023

## 2024-10-27 DIAGNOSIS — Z30.41 ORAL CONTRACEPTIVE PILL SURVEILLANCE: ICD-10-CM

## 2024-10-27 RX ORDER — NORGESTIMATE AND ETHINYL ESTRADIOL 0.25-0.035
1 KIT ORAL
Qty: 84 TABLET | Refills: 0 | Status: SHIPPED | OUTPATIENT
Start: 2024-10-27

## 2024-11-27 ENCOUNTER — OFFICE VISIT (OUTPATIENT)
Dept: OBSTETRICS AND GYNECOLOGY | Facility: CLINIC | Age: 34
End: 2024-11-27
Payer: OTHER GOVERNMENT

## 2024-11-27 VITALS
BODY MASS INDEX: 41.3 KG/M2 | HEIGHT: 68 IN | DIASTOLIC BLOOD PRESSURE: 86 MMHG | SYSTOLIC BLOOD PRESSURE: 130 MMHG | WEIGHT: 272.5 LBS

## 2024-11-27 DIAGNOSIS — Z80.3 FAMILY HISTORY OF BREAST CANCER IN SISTER: ICD-10-CM

## 2024-11-27 DIAGNOSIS — Z84.81 FAMILY HISTORY OF BRCA GENE MUTATION: ICD-10-CM

## 2024-11-27 DIAGNOSIS — Z01.419 WOMEN'S ANNUAL ROUTINE GYNECOLOGICAL EXAMINATION: Primary | ICD-10-CM

## 2024-11-27 PROCEDURE — 99999 PR PBB SHADOW E&M-EST. PATIENT-LVL III: CPT | Mod: PBBFAC,,, | Performed by: NURSE PRACTITIONER

## 2024-11-27 PROCEDURE — 99395 PREV VISIT EST AGE 18-39: CPT | Mod: S$GLB,,, | Performed by: NURSE PRACTITIONER

## 2024-11-27 NOTE — PROGRESS NOTES
CC: Annual    HPI: Pt is a 34 y.o.  female who presents for routine annual exam. She uses OCP for contraception- current break for 1-2 months. The patient participates in regular exercise: Yes.  The patient does not smoke.  The patient wears seatbelts.   Pt denies any domestic violence.    Reports suspected episodes of ovarian rupture x 2 while using OCP this year. Uncle is BRCA+, aunt passed away from breast cancer- both maternal line.      FH:  Breast cancer: yes  Colon cancer: yes  Ovarian cancer: yes  Endometrial cancer: none    ROS:  GENERAL: Feeling well overall. Denies fever or chills.   SKIN: Denies rash or lesions.   HEAD: Denies head injury or headache.   NODES: Denies enlarged lymph nodes.   CHEST: Denies chest pain or shortness of breath.   CARDIOVASCULAR: Denies palpitations or left sided chest pain.   ABDOMEN: No abdominal pain, constipation, diarrhea, nausea, vomiting or rectal bleeding.   URINARY: No dysuria, hematuria, or burning on urination.  REPRODUCTIVE: See HPI.   BREASTS: Denies pain, lumps, or nipple discharge.   HEMATOLOGIC: No easy bruisability or excessive bleeding.   MUSCULOSKELETAL: Denies joint pain or swelling.   NEUROLOGIC: Denies syncope or weakness.   PSYCHIATRIC: Denies depression, anxiety or mood swings.    PE:   APPEARANCE: Well nourished, well developed, White female in no acute distress.  NODES: no cervical, supraclavicular, or inguinal lymphadenopathy  BREASTS: Symmetrical, no skin changes or visible lesions. No palpable masses, nipple discharge or adenopathy bilaterally.  ABDOMEN: Soft. No tenderness or masses. No distention. No hernias palpated. No CVA tenderness.  VULVA: No lesions. Normal external female genitalia.  URETHRAL MEATUS: Normal size and location, no lesions, no prolapse.  URETHRA: No masses, tenderness, or prolapse.  VAGINA: Moist. No lesions or lacerations noted. No abnormal discharge present. No odor present.   CERVIX: No lesions or discharge. No cervical  motion tenderness.   UTERUS: Normal size, regular shape, mobile, non-tender.  ADNEXA: No tenderness. No fullness or masses palpated in the adnexal regions.   ANUS PERINEUM: Normal.      Diagnosis:  1. Women's annual routine gynecological examination    2. Family history of breast cancer in sister    3. Family history of BRCA gene mutation        Plan:     Orders Placed This Encounter    Ambulatory referral/consult to Genetics     Will trial continual OCP with FU in 3 months  Referral for genetic counseling.    Patient was counseled today on the new ACS guidelines for cervical cytology screening as well as the current recommendations for breast cancer screening. She was counseled to follow up with her PCP for other routine health maintenance. Counseling session lasted approximately 10 minutes, and all her questions were answered.    Follow-up with me in 1 year for routine exam       ERMA Thakkar

## 2024-12-03 ENCOUNTER — TELEPHONE (OUTPATIENT)
Dept: HEMATOLOGY/ONCOLOGY | Facility: CLINIC | Age: 34
End: 2024-12-03
Payer: OTHER GOVERNMENT

## 2024-12-03 NOTE — TELEPHONE ENCOUNTER
Called patient and left message to confirm the virtual visit on 12/10/24 and to complete the genetic questionnaire in the portal at least 2 days before the visit.

## 2024-12-09 ENCOUNTER — TELEPHONE (OUTPATIENT)
Dept: HEMATOLOGY/ONCOLOGY | Facility: CLINIC | Age: 34
End: 2024-12-09
Payer: OTHER GOVERNMENT

## 2024-12-10 ENCOUNTER — OFFICE VISIT (OUTPATIENT)
Dept: HEMATOLOGY/ONCOLOGY | Facility: CLINIC | Age: 34
End: 2024-12-10
Payer: OTHER GOVERNMENT

## 2024-12-10 ENCOUNTER — PATIENT MESSAGE (OUTPATIENT)
Dept: HEMATOLOGY/ONCOLOGY | Facility: CLINIC | Age: 34
End: 2024-12-10

## 2024-12-10 DIAGNOSIS — Z71.83 ENCOUNTER FOR NONPROCREATIVE GENETIC COUNSELING: Primary | ICD-10-CM

## 2024-12-10 DIAGNOSIS — Z84.81 FAMILY HISTORY OF BRCA GENE MUTATION: ICD-10-CM

## 2024-12-10 NOTE — PROGRESS NOTES
Cancer Genetics  Hereditary and High-Risk Clinic  Department of Hematology and Oncology  [facility]    Date of Service:  12/10/24  Visit Provider:  An Gallegos [facility]    Patient ID  Name: Evelin Tripp    : 1990    MRN: [id number]      Referring Provider  Judith Grace, NP  [address]  [address]  [place] [address]    Televisit Information  The patient location is: [place].    The chief complaint leading to consultation is:  As below.    Visit type: audiovisual.      Face-to-face time with patient:  Approximately 30 minutes.    Approximately 50 minutes in total were spent on this encounter, which includes face-to-face time and non-face-to-face time preparing to see the patient (e.g., review of tests), obtaining and/or reviewing separately obtained history, documenting clinical information in the electronic or other health record, independently interpreting results (not separately reported) and communicating results to the patient/family/caregiver, or care coordination (not separately reported).  Each patient to whom he or she provides medical services by telemedicine is:  (1) informed of the relationship between the physician and patient and the respective role of any other health care provider with respect to management of the patient; and (2) notified that he or she may decline to receive medical services by telemedicine and may withdraw from such care at any time.    IMPRESSION     Ms. Evelin Tripp is a pleasant 34 y.o. female. We discussed her personal health history, family health history, and the purpose and logistics of genetic testing for hereditary cancer syndromes. Her maternal family history is significant for second, third, fourth, and fifth degree relatives with diagnoses of breast caner, kidney cancer, prostate cancer, and other cancers. Her paternal family history is non-contributory. During our appointment, Ms. Waters shared that her  "maternal uncle had genetic testing with a BRCA finding and her mother had negative genetic testing. I was able to review her mother's genetic test report and confirmed she had negative Integrated BRACAnalysis with [facility] Hereditary Cancer Panel offered by [facility] that was reported in December of 2023. This panel includes genes that relate to her family history of cancer besides the diagnoses of leukemia and brain cancer. Hereditary cancer syndromes that increase a person's risk for leukemia and brain cancer are rare and her relatives with these diagnoses are fourth degree relatives, so it is unlikely that performing genetic testing for genes related to these diagnoses would yield an actionable findings for Ms. Waters. With this information, she decided to decline genetic testing as she felt reassured by her mother's negative genetic testing.     FOCUSED PERSONAL HISTORY     Chief Complaint: Genetic Evaluation (Family history of cancer)    History of Present Illness (HPI):  Evelin Tripp ("Evelin"), 34 y.o., assigned female sex at birth, is new to the [facility] of H[facility]ology and On[facility] and to me.  She was referred by Obstetrics and Gynecology for hereditary cancer risk assessment given her family history of a BRCA gene mutation.    Cancer History  No personal history of cancer   Patient reported a history of ovarian cysts  Pathology report from pelvic US on 11/17/2023  Owen Blanton lifetime risk of breast cancer: 15.63%    Focused Medical History  Previous germline cancer genetic testing:  No  Colonoscopy: No  Mammogram: No  Breast MRI:  No  Pancreatitis:  No    Gynecologic History  Age at menarche:  9 years old  Age at first live childbirth:  25 years old  Menopausal status:  premenopausal  Reproductive organs:  Intact    Hormone Use  No HRT reported     Tobacco Use  Tobacco Use: Medium Risk (11/27/2024)    Patient History     Smoking Tobacco Use: Former     Smokeless Tobacco Use: Never     " Passive Exposure: Current     FAMILY HISTORY       Maternal ancestry: [place]  Paternal ancestry: [facility]   Ashkenazi [employer] ancestry: None reported    A family history of birth defects, intellectual disability, SIDS, sudden early death, multiple miscarriages and consanguinity were denied. Please refer to above pedigree for further details. A larger copy has been scanned in the Media tab.   DISCUSSION     Causes of Cancer:    Cancer occurs when cells grow out of control. Some genes help protect against cancer by controlling the growth of cells. However, mutations (problems) in these genes can prevent them from working properly, increasing the risk of developing cancer.  Sporadic Cancer: Most people who get cancer have sporadic cancer. Sporadic cancer is caused by mutations that occur during the lifetime. These mutations may be caused by aging, environmental exposures (ex. UV radiation, carcinogens), lifestyle (ex. smoking, drinking alcohol, sunbathing, poor diet), other medical conditions (ex. hepatitis, HPV, ulcerative colitis), or other factors.   Hereditary Cancer: A small percentage (5-10%) of people who develop cancer were born with a mutation already in one of the cancer protection genes.  Familial Cancer: Cancer can also cluster in families that do not have an identifiable mutation. This may be due to shared environmental or lifestyle factors or genetic risk factors that have not been identified or cannot be detected using current technology or panels.    Ms. Waters reported a familial variant in one of the BRCA genes that was first identified in her maternal uncle. I was not able to review her uncle's lab report to confirm a finding. A pathogenic BRCA mutation in the family can explain the history of breast and prostate cancer, if those with a personal history of cancer have the BRCA mutation.    Ms. Waters also shared that her mother had genetic testing performed that was negative. I was able to  "review her mother's lab report and confirm she had negative Integrated BRACAnalysis with [facility] Hereditary Cancer Panel by [facility] that was reported in December of 2023. There was a variant of uncertain significance in the CDK4 gene identified on her mother's test. There have not been any updates to the classification of this variant at this time. This genetic test included the analyses of the following 48 genes: APC, MELVI, AXIN2, BAP1, BARD1, BMPR1A, BRCA1, BRCA2, BRIP1, CDH1, CDK4, CDKN2A, CHEK2, CTNNA1, EGFR, EPCAM, FH, FLCN, GREM1, HOXB13, MEN1, MET, MITF, MLH1, MSH2, MSH3, MSH6, MUTYH, NTHL1, PALB2, PMS2, POLD1, POLE, PTEN, RAD51C, RAD51D, RET, SDHA, SDHB, SDHC, SDHD, SMAD4, STK11, TERT, TP53, TSC1, TSC2, VHL.    This test is comprehensive and includes the analyses of genes related to hereditary  breast, ovarian, prostate, pancreatic, uterine, colorectal, skin, gastric, renal, lung, and endocrine cancer. The only cancer history in the family that would not be included on this test is brain cancer and leukemia. There is also a family history of bone cancer, but it is unclear if this was a primary cancer or if this relative's breast cancer metastasized to the bone. Hereditary leukemia and brain cancer are rare and the affected individuals are distant relatives to Ms. Waters. She could undergo genetic testing for these risks, but the likeliness of an actionable finding being identified on her testing is low. Her paternal family history is not suspicious of a hereditary cancer syndrome as there was no history of cancer reported.     With her mother's negative MyRisk genetic testing and a non-contributory paternal family history, genetic testing for Ms. Waters is not medically necessary. She elected to decline testing today after we discussed the advantages, disadvantages, and limitations of genetic testing.     ASSESSMENT / PLAN      Evelin Tripp ("Evelin"), 34 y.o., presented today for hereditary " cancer risk assessment and genetic counseling given a known familial BRCA mutation. I was not able to confirm the familial variant, but there is no way Ms. Waters inherited the familial variant because her mother does not have it. The genetic testing her mom completed was comprehensive and was negative for the gene related to the family history of cancer besides leukemia and brain cancer risks. It is not likely that Ms. Waters's genetic testing would reveal any actionable findings for risks related to leukemia and/or brain cancer and her paternal family history is not suspicious of a hereditary cancer syndrome. Genetic testing is not medically necessary, and Ms. Waters decided to not proceed with testing today. She is encouraged to contact me if she has any questions, concerns, or updates to her family history.       ICD-10-CM ICD-9-CM   1. Encounter for nonprocreative genetic counseling  Z71.83 V26.33   2. Family history of BRCA gene mutation  Z84.81 V18.9       Follow-up:   No follow-up necessary.      Approximately 30 minutes were spent face-to-face with the patient.  Approximately 50 minutes in total were spent on this encounter, which includes face-to-face time and non-face-to-face time preparing to see the patient (e.g., review of tests), obtaining and/or reviewing separately obtained history, documenting clinical information in the electronic or other health record, independently interpreting results (not separately reported) and communicating results to the patient/family/caregiver, or care coordination (not separately reported).     This assessment is based on the history and reports provided, as well as the current scientific knowledge regarding cancer genetics.         An Gallegos, [facility]  Genetic Counselor, Hereditary and High-Risk Clinic  Department of Hematology and Oncology  [facility]        CC:  Judith Grace

## 2025-01-10 ENCOUNTER — HOSPITAL ENCOUNTER (EMERGENCY)
Facility: HOSPITAL | Age: 35
Discharge: HOME | End: 2025-01-10
Attending: EMERGENCY MEDICINE
Payer: OTHER GOVERNMENT

## 2025-01-10 VITALS
BODY MASS INDEX: 40.92 KG/M2 | OXYGEN SATURATION: 98 % | DIASTOLIC BLOOD PRESSURE: 80 MMHG | HEART RATE: 82 BPM | TEMPERATURE: 99 F | RESPIRATION RATE: 18 BRPM | SYSTOLIC BLOOD PRESSURE: 140 MMHG | HEIGHT: 68 IN | WEIGHT: 270 LBS

## 2025-01-10 DIAGNOSIS — R10.31 RLQ ABDOMINAL PAIN: Primary | ICD-10-CM

## 2025-01-10 DIAGNOSIS — R11.0 NAUSEA: ICD-10-CM

## 2025-01-10 LAB
ALBUMIN SERPL BCP-MCNC: 3.2 G/DL (ref 3.5–5.2)
ALP SERPL-CCNC: 94 U/L (ref 40–150)
ALT SERPL W/O P-5'-P-CCNC: 13 U/L (ref 10–44)
ANION GAP SERPL CALC-SCNC: 9 MMOL/L (ref 8–16)
AST SERPL-CCNC: 13 U/L (ref 10–40)
BASOPHILS # BLD AUTO: 0.03 K/UL (ref 0–0.2)
BASOPHILS NFR BLD: 0.4 % (ref 0–1.9)
BILIRUB SERPL-MCNC: 0.2 MG/DL (ref 0.1–1)
BILIRUB UR QL STRIP: NEGATIVE
BUN SERPL-MCNC: 11 MG/DL (ref 6–20)
BUN SERPL-MCNC: 11 MG/DL (ref 6–30)
CALCIUM SERPL-MCNC: 9 MG/DL (ref 8.7–10.5)
CHLORIDE SERPL-SCNC: 106 MMOL/L (ref 95–110)
CHLORIDE SERPL-SCNC: 107 MMOL/L (ref 95–110)
CLARITY UR REFRACT.AUTO: CLEAR
CO2 SERPL-SCNC: 22 MMOL/L (ref 23–29)
COLOR UR AUTO: YELLOW
CREAT SERPL-MCNC: 0.7 MG/DL (ref 0.5–1.4)
CREAT SERPL-MCNC: 0.8 MG/DL (ref 0.5–1.4)
DIFFERENTIAL METHOD BLD: ABNORMAL
EOSINOPHIL # BLD AUTO: 0.1 K/UL (ref 0–0.5)
EOSINOPHIL NFR BLD: 1.1 % (ref 0–8)
ERYTHROCYTE [DISTWIDTH] IN BLOOD BY AUTOMATED COUNT: 13.2 % (ref 11.5–14.5)
EST. GFR  (NO RACE VARIABLE): >60 ML/MIN/1.73 M^2
GLUCOSE SERPL-MCNC: 110 MG/DL (ref 70–110)
GLUCOSE SERPL-MCNC: 113 MG/DL (ref 70–110)
GLUCOSE UR QL STRIP: NEGATIVE
HCT VFR BLD AUTO: 41.5 % (ref 37–48.5)
HCT VFR BLD CALC: 38 %PCV (ref 36–54)
HCV AB SERPL QL IA: NORMAL
HGB BLD-MCNC: 13 G/DL (ref 12–16)
HGB UR QL STRIP: NEGATIVE
HIV 1+2 AB+HIV1 P24 AG SERPL QL IA: NORMAL
IMM GRANULOCYTES # BLD AUTO: 0.03 K/UL (ref 0–0.04)
IMM GRANULOCYTES NFR BLD AUTO: 0.4 % (ref 0–0.5)
KETONES UR QL STRIP: NEGATIVE
LEUKOCYTE ESTERASE UR QL STRIP: NEGATIVE
LIPASE SERPL-CCNC: 24 U/L (ref 4–60)
LYMPHOCYTES # BLD AUTO: 1.6 K/UL (ref 1–4.8)
LYMPHOCYTES NFR BLD: 19.7 % (ref 18–48)
MCH RBC QN AUTO: 27.2 PG (ref 27–31)
MCHC RBC AUTO-ENTMCNC: 31.3 G/DL (ref 32–36)
MCV RBC AUTO: 87 FL (ref 82–98)
MONOCYTES # BLD AUTO: 0.5 K/UL (ref 0.3–1)
MONOCYTES NFR BLD: 6.5 % (ref 4–15)
NEUTROPHILS # BLD AUTO: 5.9 K/UL (ref 1.8–7.7)
NEUTROPHILS NFR BLD: 71.9 % (ref 38–73)
NITRITE UR QL STRIP: NEGATIVE
NRBC BLD-RTO: 0 /100 WBC
PH UR STRIP: 6 [PH] (ref 5–8)
PLATELET # BLD AUTO: 392 K/UL (ref 150–450)
PMV BLD AUTO: 10.3 FL (ref 9.2–12.9)
POC IONIZED CALCIUM: 1.03 MMOL/L (ref 1.06–1.42)
POC TCO2 (MEASURED): 24 MMOL/L (ref 23–29)
POTASSIUM BLD-SCNC: 4.7 MMOL/L (ref 3.5–5.1)
POTASSIUM SERPL-SCNC: 4.5 MMOL/L (ref 3.5–5.1)
PROT SERPL-MCNC: 7.3 G/DL (ref 6–8.4)
PROT UR QL STRIP: NEGATIVE
RBC # BLD AUTO: 4.78 M/UL (ref 4–5.4)
SAMPLE: ABNORMAL
SODIUM BLD-SCNC: 136 MMOL/L (ref 136–145)
SODIUM SERPL-SCNC: 138 MMOL/L (ref 136–145)
SP GR UR STRIP: 1.02 (ref 1–1.03)
URN SPEC COLLECT METH UR: NORMAL
WBC # BLD AUTO: 8.18 K/UL (ref 3.9–12.7)

## 2025-01-10 PROCEDURE — 25500020 PHARM REV CODE 255: Performed by: EMERGENCY MEDICINE

## 2025-01-10 PROCEDURE — 87389 HIV-1 AG W/HIV-1&-2 AB AG IA: CPT | Performed by: PHYSICIAN ASSISTANT

## 2025-01-10 PROCEDURE — 80048 BASIC METABOLIC PNL TOTAL CA: CPT | Mod: XB

## 2025-01-10 PROCEDURE — 85025 COMPLETE CBC W/AUTO DIFF WBC: CPT | Performed by: EMERGENCY MEDICINE

## 2025-01-10 PROCEDURE — 96374 THER/PROPH/DIAG INJ IV PUSH: CPT

## 2025-01-10 PROCEDURE — 99285 EMERGENCY DEPT VISIT HI MDM: CPT | Mod: 25

## 2025-01-10 PROCEDURE — 86803 HEPATITIS C AB TEST: CPT | Performed by: PHYSICIAN ASSISTANT

## 2025-01-10 PROCEDURE — 25000003 PHARM REV CODE 250: Performed by: EMERGENCY MEDICINE

## 2025-01-10 PROCEDURE — 80053 COMPREHEN METABOLIC PANEL: CPT | Performed by: EMERGENCY MEDICINE

## 2025-01-10 PROCEDURE — 63600175 PHARM REV CODE 636 W HCPCS: Performed by: EMERGENCY MEDICINE

## 2025-01-10 PROCEDURE — 81003 URINALYSIS AUTO W/O SCOPE: CPT | Performed by: EMERGENCY MEDICINE

## 2025-01-10 PROCEDURE — 83690 ASSAY OF LIPASE: CPT | Performed by: EMERGENCY MEDICINE

## 2025-01-10 PROCEDURE — 96361 HYDRATE IV INFUSION ADD-ON: CPT

## 2025-01-10 RX ORDER — ONDANSETRON 4 MG/1
4 TABLET, ORALLY DISINTEGRATING ORAL EVERY 6 HOURS PRN
Qty: 15 TABLET | Refills: 0 | Status: SHIPPED | OUTPATIENT
Start: 2025-01-10 | End: 2025-01-10

## 2025-01-10 RX ORDER — ONDANSETRON 4 MG/1
4 TABLET, ORALLY DISINTEGRATING ORAL EVERY 6 HOURS PRN
Qty: 15 TABLET | Refills: 0 | Status: SHIPPED | OUTPATIENT
Start: 2025-01-10

## 2025-01-10 RX ORDER — ONDANSETRON HYDROCHLORIDE 2 MG/ML
4 INJECTION, SOLUTION INTRAVENOUS
Status: COMPLETED | OUTPATIENT
Start: 2025-01-10 | End: 2025-01-10

## 2025-01-10 RX ORDER — ACETAMINOPHEN 325 MG/1
650 TABLET ORAL
Status: COMPLETED | OUTPATIENT
Start: 2025-01-10 | End: 2025-01-10

## 2025-01-10 RX ADMIN — SODIUM CHLORIDE, POTASSIUM CHLORIDE, SODIUM LACTATE AND CALCIUM CHLORIDE 1000 ML: 600; 310; 30; 20 INJECTION, SOLUTION INTRAVENOUS at 03:01

## 2025-01-10 RX ADMIN — ACETAMINOPHEN 650 MG: 325 TABLET ORAL at 03:01

## 2025-01-10 RX ADMIN — ONDANSETRON 4 MG: 2 INJECTION INTRAMUSCULAR; INTRAVENOUS at 03:01

## 2025-01-10 RX ADMIN — IOHEXOL 100 ML: 350 INJECTION, SOLUTION INTRAVENOUS at 04:01

## 2025-01-10 NOTE — DISCHARGE INSTRUCTIONS
No emergent cause of your symptoms was found.  Keep yourself hydrated.  Take Zofran as needed for nausea.  Return to the ER for any worsening pain, inability to tolerate fluids, fever, or other concerning symptoms.

## 2025-01-10 NOTE — ED PROVIDER NOTES
Encounter Date: 1/10/2025       History     Chief Complaint   Patient presents with    Abdominal Pain     Patient reports began generalized but now more on the right. Also reports some nausea, denies any vomiting. Started 2 hours ago.    Back Pain     Patient reports right back pain around the ribs.     33 yo W with pmhx umbilical hernia repair, ovarian cyst presents with a chief complaint of abdominal pain.  Patient awoke 2 hours ago with generalized abdominal pain.  It has been constant but is now located to the right lower quadrant.  She also notes associated pain in the right flank.  She notes pain is similar in quality to previous ovarian cysts that have ruptured but it has never radiated to the right flank.  She notes associated nausea and chills.  No dysuria, frequency, vomiting, diarrhea, constipation, chest pain, shortness of breath.  Patient took ibuprofen with some relief of symptoms.  She called her insurance and recommended she come to the ER for evaluation for appendicitis.        Review of patient's allergies indicates:   Allergen Reactions    Percocet [oxycodone-acetaminophen] Itching    Zithromax [azithromycin] Hives    Duloxetine Nausea And Vomiting     Past Medical History:   Diagnosis Date    Oral contraceptive use      Past Surgical History:   Procedure Laterality Date    UMBILICAL HERNIA REPAIR  2007     Family History   Problem Relation Name Age of Onset    BRCA2 Negative Mother Marlyn     BRCA1 Negative Mother Marlyn     No Known Problems Father Donald     Breast cancer Maternal Aunt Sana 68        No GT, mets to bones    Breast cancer Other Veronica 30    Bone cancer Other Patricia     Breast cancer Other Patricia 30    Thyroid cancer Other Vera 40    Breast cancer Other Vera 40    Prostate cancer Other Quinten 50    Kidney cancer Other Quinten 50    Testicular cancer Other Willian 30    Prostate cancer Other Ray 50    Brain cancer Other Mar 30    Leukemia Other Peg 1    Breast cancer Other [place] 50     Breast cancer Other Lucy 50    Breast cancer Other Stacy 50    Prostate cancer Other Richard 40    Thyroid cancer Other Don     Brain cancer Other Don     Prostate cancer Other Don     Thyroid cancer Other Alondra 50 - 60    Breast cancer Other Alondra 50    Prostate cancer Other Marino     Prostate cancer Other Dwayne     Breast cancer Other Sharri 50 - 59    Prostate cancer Other Salvatore 60 - 69    Colon cancer Neg Hx       Social History     Tobacco Use    Smoking status: Former     Passive exposure: Current (vape with nicotine)    Smokeless tobacco: Never   Substance Use Topics    Alcohol use: Yes     Comment: once/month    Drug use: No     Review of Systems    Physical Exam     Initial Vitals [01/10/25 0249]   BP Pulse Resp Temp SpO2   131/89 88 18 97.8 °F (36.6 °C) 95 %      MAP       --         Physical Exam    Nursing note and vitals reviewed.  Constitutional: She appears well-developed and well-nourished. She is not diaphoretic. No distress.   HENT:   Head: Normocephalic and atraumatic.   Eyes: Right eye exhibits no discharge. Left eye exhibits no discharge. No scleral icterus.   Neck: Neck supple. No JVD present.   Normal range of motion.  Cardiovascular:  Normal rate, regular rhythm and normal heart sounds.     Exam reveals no gallop and no friction rub.       No murmur heard.  Pulmonary/Chest: Breath sounds normal. No respiratory distress. She has no wheezes. She has no rhonchi. She has no rales.   Abdominal: Abdomen is soft. She exhibits no distension and no mass. There is abdominal tenderness (mild, to deep palpation) in the right lower quadrant.   No right CVA tenderness.  No left CVA tenderness. There is no rebound, no guarding and negative Landis's sign.   Musculoskeletal:         General: No tenderness or edema. Normal range of motion.      Cervical back: Normal range of motion and neck supple.      Comments: No tenderness palpation throughout back musculature over region of pain      Neurological: She is alert and oriented to person, place, and time. She has normal strength. No sensory deficit.   Skin: Skin is warm and dry. Capillary refill takes less than 2 seconds.   Psychiatric: Thought content normal.         ED Course   Procedures  Labs Reviewed   CBC W/ AUTO DIFFERENTIAL - Abnormal       Result Value    WBC 8.18      RBC 4.78      Hemoglobin 13.0      Hematocrit 41.5      MCV 87      MCH 27.2      MCHC 31.3 (*)     RDW 13.2      Platelets 392      MPV 10.3      Immature Granulocytes 0.4      Gran # (ANC) 5.9      Immature Grans (Abs) 0.03      Lymph # 1.6      Mono # 0.5      Eos # 0.1      Baso # 0.03      nRBC 0      Gran % 71.9      Lymph % 19.7      Mono % 6.5      Eosinophil % 1.1      Basophil % 0.4      Differential Method Automated      Narrative:     Release to patient->Immediate   COMPREHENSIVE METABOLIC PANEL - Abnormal    Sodium 138      Potassium 4.5      Chloride 107      CO2 22 (*)     Glucose 110      BUN 11      Creatinine 0.7      Calcium 9.0      Total Protein 7.3      Albumin 3.2 (*)     Total Bilirubin 0.2      Alkaline Phosphatase 94      AST 13      ALT 13      eGFR >60.0      Anion Gap 9      Narrative:     Release to patient->Immediate   ISTAT PROCEDURE - Abnormal    POC Glucose 113 (*)     POC BUN 11      POC Creatinine 0.8      POC Sodium 136      POC Potassium 4.7      POC Chloride 106      POC TCO2 (MEASURED) 24      POC Ionized Calcium 1.03 (*)     POC Hematocrit 38      Sample SHELLY     HEPATITIS C ANTIBODY    Hepatitis C Ab Non-reactive      Narrative:     Release to patient->Immediate   HIV 1 / 2 ANTIBODY    HIV 1/2 Ag/Ab Non-reactive      Narrative:     Release to patient->Immediate   LIPASE    Lipase 24      Narrative:     Release to patient->Immediate   URINALYSIS, REFLEX TO URINE CULTURE    Specimen UA Urine, Clean Catch      Color, UA Yellow      Appearance, UA Clear      pH, UA 6.0      Specific Gravity, UA 1.025      Protein, UA Negative       Glucose, UA Negative      Ketones, UA Negative      Bilirubin (UA) Negative      Occult Blood UA Negative      Nitrite, UA Negative      Leukocytes, UA Negative      Narrative:     Specimen Source->Urine          Imaging Results              CT Abdomen Pelvis With IV Contrast NO Oral Contrast (Final result)  Result time 01/10/25 06:16:16      Final result by Michael Herrera MD (01/10/25 06:16:16)                   Impression:      1. No definite acute findings identified in the abdomen or pelvis to account for the patient's reported symptoms.  Normal appearance of the appendix in a patient with reported history of right lower quadrant abdominal pain.  2. Additional details of chronic and incidental findings, as provided in the body of the report.      Electronically signed by: Michael Herrera  Date:    01/10/2025  Time:    06:16               Narrative:    EXAMINATION:  CT ABDOMEN PELVIS WITH IV CONTRAST    CLINICAL HISTORY:  RLQ abdominal pain (Age >= 14y);    TECHNIQUE:  Low dose axial images, sagittal and coronal reformations were obtained from the lung bases to the pubic symphysis following the IV administration of 100 mL of Omnipaque 350    COMPARISON:  CT of the abdomen and pelvis performed 10/03/2023.    FINDINGS:  Lower chest: Unremarkable.    Liver: Hepatomegaly.  Normal contour.    Gallbladder and bile ducts: Cholelithiasis and/or biliary sludge.  No definite focal pericholecystic inflammation or biliary ductal dilatation.    Pancreas: Unremarkable.    Spleen: Splenomegaly.    Adrenals: Unremarkable.    Kidneys: Unremarkable.    Lymph nodes: Scattered mildly prominent number mesenteric and retroperitoneal lymph nodes, somewhat more prominent in the right lower quadrant, possibly reactive.  Mesenteric adenitis may be considered.  Overall appearance similar to 10/03/2023 CT.    Bowel and mesentery: No evidence of bowel obstruction.  Colonic diverticulosis.  Moderate volume colonic stool.  Normal  appendix.    Abdominal aorta: Unremarkable.    Inferior vena cava: Unremarkable.    Free fluid or free air: None.    Pelvis: Approximate 12 mm hypodense lesion associated with the left adnexa, possibly dominant follicle cyst.    Body wall: Unremarkable.    Bones: Unremarkable.                                       Medications   lactated ringers bolus 1,000 mL (0 mLs Intravenous Stopped 1/10/25 9834)   acetaminophen tablet 650 mg (650 mg Oral Given 1/10/25 7674)   ondansetron injection 4 mg (4 mg Intravenous Given 1/10/25 8044)   iohexoL (OMNIPAQUE 350) injection 100 mL (100 mLs Intravenous Given 1/10/25 0077)     Medical Decision Making  35 yo W with pmhx umbilical hernia repair, ovarian cyst presents with a chief complaint of 2 hours of right flank pain and right lower quadrant abdominal pain.    Differential includes, but not limited to: Ruptured ovarian cyst, ectopic pregnancy, pregnancy, appendicitis, cholelithiasis, cholecystitis, nephrolithiasis    Will administer IV fluids, antiemetics, acetaminophen as requested.  Will obtain labs and CT abdomen/pelvis.    Reassessment: Urinalysis negative for blood or infection.  Chem 8 without emergent abnormalities.  CBC without leukocytosis or anemia.  CMP is normal.  Lipase is normal.  Patient has returned from CT.  On repeat assessment, she notes pain resolved and nausea has significantly improved.  She has no tenderness on palpation throughout her abdomen.  Will await CT results.  On my independent interpretation, I do see some stones and sludge in the gallbladder but patient with no right upper quadrant tenderness, negative Landis's sign, no fevers, and nausea resolved, this is not acute cholecystitis.  At this time, my shift is coming to a close and the patient was signed out to incoming MD.    Amount and/or Complexity of Data Reviewed  Labs: ordered.  Radiology: ordered.    Risk  OTC drugs.  Prescription drug management.                                       Clinical Impression:  Final diagnoses:  [R10.31] RLQ abdominal pain (Primary)  [R11.0] Nausea          ED Disposition Condition    Discharge Stable          ED Prescriptions       Medication Sig Dispense Start Date End Date Auth. Provider    ondansetron (ZOFRAN-ODT) 4 MG TbDL  (Status: Discontinued) Take 1 tablet (4 mg total) by mouth every 6 (six) hours as needed (Nausea). 15 tablet 1/10/2025 1/10/2025 Hao Lilly MD    ondansetron (ZOFRAN-ODT) 4 MG TbDL Take 1 tablet (4 mg total) by mouth every 6 (six) hours as needed (Nausea). 15 tablet 1/10/2025 -- Mayte Srinivasan MD          Follow-up Information       Follow up With Specialties Details Why Contact Info    [facility] Emergency Medicine  As needed, If symptoms worsen [id number]  [facility] [id number]  [phone]             Hao Lilly MD  01/15/25 0783

## 2025-01-10 NOTE — PROVIDER PROGRESS NOTES - EMERGENCY DEPT.
Encounter Date: 1/10/2025    ED Physician Progress Notes        ED Physician Hand-off Note:    ED Course: I assumed care of patient from off-going ED physician team. Briefly, Patient is a 33 yo F with PMH of pmhx umbilical hernia repair, ovarian cyst presents with a chief complaint of abdominal pain. Patient awoke 2 hours ago with generalized abdominal pain. It has been constant but is now located to the right lower quadrant. She also notes associated pain in the right flank     At the time of signout plan was pending - reassessment, CT Ab pelvis.    Medications given in the ED:    Medications   lactated ringers bolus 1,000 mL (0 mLs Intravenous Stopped 1/10/25 0522)   acetaminophen tablet 650 mg (650 mg Oral Given 1/10/25 0354)   ondansetron injection 4 mg (4 mg Intravenous Given 1/10/25 0354)   iohexoL (OMNIPAQUE 350) injection 100 mL (100 mLs Intravenous Given 1/10/25 0419)     Imaging Results              CT Abdomen Pelvis With IV Contrast NO Oral Contrast (Final result)  Result time 01/10/25 06:16:16      Final result by Michael Herrera MD (01/10/25 06:16:16)                   Impression:      1. No definite acute findings identified in the abdomen or pelvis to account for the patient's reported symptoms.  Normal appearance of the appendix in a patient with reported history of right lower quadrant abdominal pain.  2. Additional details of chronic and incidental findings, as provided in the body of the report.      Electronically signed by: Michael Herrera  Date:    01/10/2025  Time:    06:16               Narrative:    EXAMINATION:  CT ABDOMEN PELVIS WITH IV CONTRAST    CLINICAL HISTORY:  RLQ abdominal pain (Age >= 14y);    TECHNIQUE:  Low dose axial images, sagittal and coronal reformations were obtained from the lung bases to the pubic symphysis following the IV administration of 100 mL of Omnipaque 350    COMPARISON:  CT of the abdomen and pelvis performed 10/03/2023.    FINDINGS:  Lower chest:  Unremarkable.    Liver: Hepatomegaly.  Normal contour.    Gallbladder and bile ducts: Cholelithiasis and/or biliary sludge.  No definite focal pericholecystic inflammation or biliary ductal dilatation.    Pancreas: Unremarkable.    Spleen: Splenomegaly.    Adrenals: Unremarkable.    Kidneys: Unremarkable.    Lymph nodes: Scattered mildly prominent number mesenteric and retroperitoneal lymph nodes, somewhat more prominent in the right lower quadrant, possibly reactive.  Mesenteric adenitis may be considered.  Overall appearance similar to 10/03/2023 CT.    Bowel and mesentery: No evidence of bowel obstruction.  Colonic diverticulosis.  Moderate volume colonic stool.  Normal appendix.    Abdominal aorta: Unremarkable.    Inferior vena cava: Unremarkable.    Free fluid or free air: None.    Pelvis: Approximate 12 mm hypodense lesion associated with the left adnexa, possibly dominant follicle cyst.    Body wall: Unremarkable.    Bones: Unremarkable.                                    7:39 AM  Patient feeling better but still nauseated. She notes she wants to go home and rest  Will prescribe Zofran    Disposition: home    Patient comfortable with plan for discharge. Patient counseled regarding exam, results, diagnosis, treatment, and plan.    Impression: Final diagnoses:  [R10.31] RLQ abdominal pain (Primary)

## 2025-01-10 NOTE — ED TRIAGE NOTES
"  Patient identifiers for Evelin Tripp 34 y.o. female checked and correct.  Chief Complaint   Patient presents with    Abdominal Pain     Patient reports began generalized but now more on the right. Also reports some nausea, denies any vomiting. Started 2 hours ago.    Back Pain     Patient reports right back pain around the ribs.   Pt states hx of ruptured ovarian cysts but states "feels different." Denies dysuria, hematuria, frequency/ urgency  Past Medical History:   Diagnosis Date    Oral contraceptive use      Allergies reported:   Review of patient's allergies indicates:   Allergen Reactions    Percocet [oxycodone-acetaminophen] Itching    Zithromax [azithromycin] Hives    Duloxetine Nausea And Vomiting         HEENT: Denies vision changes. Denies ear drainage or hearing loss. No c/o nasal drainage. Denies dysphagia or voice changes.   Appearance: Pt awake, alert & oriented to person, place & time. Pt in no acute distress at present time. Pt is clean and well groomed with clothes appropriately fastened.   Skin: Skin warm, dry & intact. Color consistent with ethnicity. Mucous membranes moist. No breakdown or brusing noted.   Musculoskeletal: Patient moving all extremities well, no obvious swelling or deformities noted.   Respiratory: Respirations spontaneous, even, and non-labored. Visible chest rise noted. Airway is open and patent. No accessory muscle use noted.   Neurologic: Sensation is intact. Speech is clear and appropriate. Eyes open spontaneously, behavior appropriate to situation, follows commands, facial expression symmetrical, bilateral hand grasp equal and even, purposeful motor response noted.  Cardiac: All peripheral pulses present. No Bilateral lower extremity edema.   Abdomen: Abdomen soft, non distended, +RUQ abd pain, +umbilicus abd pain, +nausea  : Pt voids independently, denies dysuria, hematuria, frequency.  "

## 2025-01-10 NOTE — ED NOTES
I-STAT Chem-8+ Results:   Value Reference Range   Sodium 136 136-145 mmol/L   Potassium  4.7 3.5-5.1 mmol/L   Chloride 106  mmol/L   Ionized Calcium 1.03 1.06-1.42 mmol/L   CO2 (measured) 24 23-29 mmol/L   Glucose 113  mg/dL   BUN 11 6-30 mg/dL   Creatinine 0.8 0.5-1.4 mg/dL   Hematocrit 38 36-54%

## 2025-02-14 ENCOUNTER — OFFICE VISIT (OUTPATIENT)
Dept: OBSTETRICS AND GYNECOLOGY | Facility: CLINIC | Age: 35
End: 2025-02-14
Payer: MEDICAID

## 2025-02-14 DIAGNOSIS — Z79.899 FOLLOW-UP ENCOUNTER INVOLVING MEDICATION: Primary | ICD-10-CM

## 2025-02-17 NOTE — PROGRESS NOTES
Subjective:      Evelin Tripp is a 34 y.o. female who is here for follow-up of new OCP via virtual visit. Switched to continual use of Estarylla due to persistent ovarian cysts resulting in past episodes of ovarian cyst rupture.    She is very happy with OCP and continual use. No BTB reported or adverse side effects.    Admission on 01/10/2025, Discharged on 01/10/2025   Component Date Value Ref Range Status    Hepatitis C Ab 01/10/2025 Non-reactive  Non-reactive Final    HIV 1/2 Ag/Ab 01/10/2025 Non-reactive  Non-reactive Final    WBC 01/10/2025 8.18  3.90 - 12.70 K/uL Final    RBC 01/10/2025 4.78  4.00 - 5.40 M/uL Final    Hemoglobin 01/10/2025 13.0  12.0 - 16.0 g/dL Final    Hematocrit 01/10/2025 41.5  37.0 - 48.5 % Final    MCV 01/10/2025 87  82 - 98 fL Final    MCH 01/10/2025 27.2  27.0 - 31.0 pg Final    MCHC 01/10/2025 31.3 (L)  32.0 - 36.0 g/dL Final    RDW 01/10/2025 13.2  11.5 - 14.5 % Final    Platelets 01/10/2025 392  150 - 450 K/uL Final    MPV 01/10/2025 10.3  9.2 - 12.9 fL Final    Immature Granulocytes 01/10/2025 0.4  0.0 - 0.5 % Final    Gran # (ANC) 01/10/2025 5.9  1.8 - 7.7 K/uL Final    Immature Grans (Abs) 01/10/2025 0.03  0.00 - 0.04 K/uL Final    Lymph # 01/10/2025 1.6  1.0 - 4.8 K/uL Final    Mono # 01/10/2025 0.5  0.3 - 1.0 K/uL Final    Eos # 01/10/2025 0.1  0.0 - 0.5 K/uL Final    Baso # 01/10/2025 0.03  0.00 - 0.20 K/uL Final    nRBC 01/10/2025 0  0 /100 WBC Final    Gran % 01/10/2025 71.9  38.0 - 73.0 % Final    Lymph % 01/10/2025 19.7  18.0 - 48.0 % Final    Mono % 01/10/2025 6.5  4.0 - 15.0 % Final    Eosinophil % 01/10/2025 1.1  0.0 - 8.0 % Final    Basophil % 01/10/2025 0.4  0.0 - 1.9 % Final    Differential Method 01/10/2025 Automated   Final    Sodium 01/10/2025 138  136 - 145 mmol/L Final    Potassium 01/10/2025 4.5  3.5 - 5.1 mmol/L Final    Chloride 01/10/2025 107  95 - 110 mmol/L Final    CO2 01/10/2025 22 (L)  23 - 29 mmol/L Final    Glucose 01/10/2025 110  70 - 110 mg/dL  Final    BUN 01/10/2025 11  6 - 20 mg/dL Final    Creatinine 01/10/2025 0.7  0.5 - 1.4 mg/dL Final    Calcium 01/10/2025 9.0  8.7 - 10.5 mg/dL Final    Total Protein 01/10/2025 7.3  6.0 - 8.4 g/dL Final    Albumin 01/10/2025 3.2 (L)  3.5 - 5.2 g/dL Final    Total Bilirubin 01/10/2025 0.2  0.1 - 1.0 mg/dL Final    Alkaline Phosphatase 01/10/2025 94  40 - 150 U/L Final    AST 01/10/2025 13  10 - 40 U/L Final    ALT 01/10/2025 13  10 - 44 U/L Final    eGFR 01/10/2025 >60.0  >60 mL/min/1.73 m^2 Final    Anion Gap 01/10/2025 9  8 - 16 mmol/L Final    Lipase 01/10/2025 24  4 - 60 U/L Final    Specimen UA 01/10/2025 Urine, Clean Catch   Final    Color, UA 01/10/2025 Yellow  Yellow, Straw, Peg Final    Appearance, UA 01/10/2025 Clear  Clear Final    pH, UA 01/10/2025 6.0  5.0 - 8.0 Final    Specific Gravity, UA 01/10/2025 1.025  1.005 - 1.030 Final    Protein, UA 01/10/2025 Negative  Negative Final    Glucose, UA 01/10/2025 Negative  Negative Final    Ketones, UA 01/10/2025 Negative  Negative Final    Bilirubin (UA) 01/10/2025 Negative  Negative Final    Occult Blood UA 01/10/2025 Negative  Negative Final    Nitrite, UA 01/10/2025 Negative  Negative Final    Leukocytes, UA 01/10/2025 Negative  Negative Final    POC Glucose 01/10/2025 113 (H)  70 - 110 mg/dL Final    POC BUN 01/10/2025 11  6 - 30 mg/dL Final    POC Creatinine 01/10/2025 0.8  0.5 - 1.4 mg/dL Final    POC Sodium 01/10/2025 136  136 - 145 mmol/L Final    POC Potassium 01/10/2025 4.7  3.5 - 5.1 mmol/L Final    POC Chloride 01/10/2025 106  95 - 110 mmol/L Final    POC TCO2 (MEASURED) 01/10/2025 24  23 - 29 mmol/L Final    POC Ionized Calcium 01/10/2025 1.03 (L)  1.06 - 1.42 mmol/L Final    POC Hematocrit 01/10/2025 38  36 - 54 %PCV Final    Sample 01/10/2025 SHELLY   Final       Past Medical History:   Diagnosis Date    Oral contraceptive use      Past Surgical History:   Procedure Laterality Date    UMBILICAL HERNIA REPAIR  2007     Social History[1]  Family  History   Problem Relation Name Age of Onset    BRCA2 Negative Mother Marlyn     BRCA1 Negative Mother Marlyn     No Known Problems Father Donald     Breast cancer Maternal Aunt Sana 68        No GT, mets to bones    Breast cancer Other Veronica 30    Bone cancer Other Patricia     Breast cancer Other Patricia 30    Thyroid cancer Other Vera 40    Breast cancer Other Vera 40    Prostate cancer Other Quinten 50    Kidney cancer Other Quinten 50    Testicular cancer Other Willian 30    Prostate cancer Other Ray 50    Brain cancer Other Mar 30    Leukemia Other Peg 1    Breast cancer Other [place] 50    Breast cancer Other Lucy 50    Breast cancer Other Stacy 50    Prostate cancer Other Richard 40    Thyroid cancer Other Don     Brain cancer Other Don     Prostate cancer Other Don     Thyroid cancer Other Alondra 50 - 60    Breast cancer Other Alondra 50    Prostate cancer Other Marino     Prostate cancer Other Dwayne     Breast cancer Other Sharri 50 - 59    Prostate cancer Other Salvatore 60 - 69    Colon cancer Neg Hx       OB History    Para Term  AB Living   1 1 1 0 0 1   SAB IAB Ectopic Multiple Live Births   0 0 0 0 1      # Outcome Date GA Lbr Ralph/2nd Weight Sex Type Anes PTL Lv   1 Term 16 40w0d  3.544 kg (7 lb 13 oz) F Vag-Spont  N JACKIE     Current Medications[2]    There were no vitals filed for this visit.  There is no height or weight on file to calculate BMI.       Assessment:    Follow-up encounter involving medication        Plan:     Will continual current OCP regime.    FU in 1 year for WWNANCY Núñez-C    The patient location is: [place]  The chief complaint leading to consultation is: OCP FU    Visit type: audiovisual    Face to Face time with patient: 5 minutes  10 minutes of total time spent on the encounter, which includes face to face time and non-face to face time preparing to see the patient (eg, review of tests), Obtaining and/or reviewing separately obtained history,  Documenting clinical information in the electronic or other health record, Independently interpreting results (not separately reported) and communicating results to the patient/family/caregiver, or Care coordination (not separately reported).       Each patient to whom he or she provides medical services by telemedicine is:  (1) informed of the relationship between the physician and patient and the respective role of any other health care provider with respect to management of the patient; and (2) notified that he or she may decline to receive medical services by telemedicine and may withdraw from such care at any time.         [1]   Social History  Tobacco Use    Smoking status: Former     Passive exposure: Current (vape with nicotine)    Smokeless tobacco: Never   Substance Use Topics    Alcohol use: Yes     Comment: once/month    Drug use: No   [2]   Current Outpatient Medications:     cholecalciferol, vitamin D3, 1,250 mcg (50,000 unit) capsule, TAKE 1 CAPSULE BY MOUTH ONE TIME PER WEEK FOR 30 DAYS, Disp: , Rfl:     ESTARYLLA 0.25-35 mg-mcg per tablet, TAKE 1 TABLET BY MOUTH EVERY DAY, Disp: 84 tablet, Rfl: 0    ondansetron (ZOFRAN-ODT) 4 MG TbDL, Take 1 tablet (4 mg total) by mouth every 6 (six) hours as needed (Nausea)., Disp: 15 tablet, Rfl: 0    VYVANSE 50 mg capsule, 1 capsule in the morning Orally Once a day for 30 days, Disp: , Rfl:

## 2025-02-18 DIAGNOSIS — Z30.41 ORAL CONTRACEPTIVE PILL SURVEILLANCE: ICD-10-CM

## 2025-02-19 RX ORDER — NORGESTIMATE AND ETHINYL ESTRADIOL 0.25-0.035
1 KIT ORAL DAILY
Qty: 84 TABLET | Refills: 3 | Status: SHIPPED | OUTPATIENT
Start: 2025-02-19 | End: 2026-02-19